# Patient Record
Sex: MALE | Race: BLACK OR AFRICAN AMERICAN | NOT HISPANIC OR LATINO | URBAN - METROPOLITAN AREA
[De-identification: names, ages, dates, MRNs, and addresses within clinical notes are randomized per-mention and may not be internally consistent; named-entity substitution may affect disease eponyms.]

---

## 2019-05-04 ENCOUNTER — INPATIENT (INPATIENT)
Facility: HOSPITAL | Age: 64
LOS: 4 days | Discharge: NURSING, REHAB OR LONG-TERM CARE | DRG: 554 | End: 2019-05-09
Attending: INTERNAL MEDICINE | Admitting: INTERNAL MEDICINE
Payer: COMMERCIAL

## 2019-05-04 VITALS
HEART RATE: 70 BPM | RESPIRATION RATE: 16 BRPM | TEMPERATURE: 98 F | SYSTOLIC BLOOD PRESSURE: 150 MMHG | WEIGHT: 220.02 LBS | OXYGEN SATURATION: 98 % | DIASTOLIC BLOOD PRESSURE: 90 MMHG

## 2019-05-04 LAB
ALBUMIN SERPL ELPH-MCNC: 3.5 G/DL — SIGNIFICANT CHANGE UP (ref 3.5–5)
ALP SERPL-CCNC: 67 U/L — SIGNIFICANT CHANGE UP (ref 40–120)
ALT FLD-CCNC: 17 U/L DA — SIGNIFICANT CHANGE UP (ref 10–60)
ANION GAP SERPL CALC-SCNC: 5 MMOL/L — SIGNIFICANT CHANGE UP (ref 5–17)
AST SERPL-CCNC: 20 U/L — SIGNIFICANT CHANGE UP (ref 10–40)
BASOPHILS # BLD AUTO: 0.04 K/UL — SIGNIFICANT CHANGE UP (ref 0–0.2)
BASOPHILS NFR BLD AUTO: 0.6 % — SIGNIFICANT CHANGE UP (ref 0–2)
BILIRUB SERPL-MCNC: 0.4 MG/DL — SIGNIFICANT CHANGE UP (ref 0.2–1.2)
BUN SERPL-MCNC: 10 MG/DL — SIGNIFICANT CHANGE UP (ref 7–18)
CALCIUM SERPL-MCNC: 9 MG/DL — SIGNIFICANT CHANGE UP (ref 8.4–10.5)
CHLORIDE SERPL-SCNC: 103 MMOL/L — SIGNIFICANT CHANGE UP (ref 96–108)
CO2 SERPL-SCNC: 29 MMOL/L — SIGNIFICANT CHANGE UP (ref 22–31)
CREAT SERPL-MCNC: 0.8 MG/DL — SIGNIFICANT CHANGE UP (ref 0.5–1.3)
EOSINOPHIL # BLD AUTO: 0.35 K/UL — SIGNIFICANT CHANGE UP (ref 0–0.5)
EOSINOPHIL NFR BLD AUTO: 5.4 % — SIGNIFICANT CHANGE UP (ref 0–6)
ERYTHROCYTE [SEDIMENTATION RATE] IN BLOOD: 62 MM/HR — HIGH (ref 0–20)
GLUCOSE SERPL-MCNC: 81 MG/DL — SIGNIFICANT CHANGE UP (ref 70–99)
HCT VFR BLD CALC: 39.4 % — SIGNIFICANT CHANGE UP (ref 39–50)
HGB BLD-MCNC: 13.4 G/DL — SIGNIFICANT CHANGE UP (ref 13–17)
IMM GRANULOCYTES NFR BLD AUTO: 0.2 % — SIGNIFICANT CHANGE UP (ref 0–1.5)
LACTATE SERPL-SCNC: 0.7 MMOL/L — SIGNIFICANT CHANGE UP (ref 0.7–2)
LIDOCAIN IGE QN: 99 U/L — SIGNIFICANT CHANGE UP (ref 73–393)
LYMPHOCYTES # BLD AUTO: 1.58 K/UL — SIGNIFICANT CHANGE UP (ref 1–3.3)
LYMPHOCYTES # BLD AUTO: 24.6 % — SIGNIFICANT CHANGE UP (ref 13–44)
MCHC RBC-ENTMCNC: 32.8 PG — SIGNIFICANT CHANGE UP (ref 27–34)
MCHC RBC-ENTMCNC: 34 GM/DL — SIGNIFICANT CHANGE UP (ref 32–36)
MCV RBC AUTO: 96.3 FL — SIGNIFICANT CHANGE UP (ref 80–100)
MONOCYTES # BLD AUTO: 0.45 K/UL — SIGNIFICANT CHANGE UP (ref 0–0.9)
MONOCYTES NFR BLD AUTO: 7 % — SIGNIFICANT CHANGE UP (ref 2–14)
NEUTROPHILS # BLD AUTO: 4 K/UL — SIGNIFICANT CHANGE UP (ref 1.8–7.4)
NEUTROPHILS NFR BLD AUTO: 62.2 % — SIGNIFICANT CHANGE UP (ref 43–77)
NRBC # BLD: 0 /100 WBCS — SIGNIFICANT CHANGE UP (ref 0–0)
PLATELET # BLD AUTO: 288 K/UL — SIGNIFICANT CHANGE UP (ref 150–400)
POTASSIUM SERPL-MCNC: 4 MMOL/L — SIGNIFICANT CHANGE UP (ref 3.5–5.3)
POTASSIUM SERPL-SCNC: 4 MMOL/L — SIGNIFICANT CHANGE UP (ref 3.5–5.3)
PROT SERPL-MCNC: 7.9 G/DL — SIGNIFICANT CHANGE UP (ref 6–8.3)
RBC # BLD: 4.09 M/UL — LOW (ref 4.2–5.8)
RBC # FLD: 11.6 % — SIGNIFICANT CHANGE UP (ref 10.3–14.5)
SODIUM SERPL-SCNC: 137 MMOL/L — SIGNIFICANT CHANGE UP (ref 135–145)
WBC # BLD: 6.43 K/UL — SIGNIFICANT CHANGE UP (ref 3.8–10.5)
WBC # FLD AUTO: 6.43 K/UL — SIGNIFICANT CHANGE UP (ref 3.8–10.5)

## 2019-05-04 PROCEDURE — 99285 EMERGENCY DEPT VISIT HI MDM: CPT | Mod: 25

## 2019-05-04 PROCEDURE — 74177 CT ABD & PELVIS W/CONTRAST: CPT | Mod: 26

## 2019-05-04 RX ORDER — COLCHICINE 0.6 MG
1.8 TABLET ORAL ONCE
Qty: 0 | Refills: 0 | Status: COMPLETED | OUTPATIENT
Start: 2019-05-04 | End: 2019-05-04

## 2019-05-04 RX ORDER — SODIUM CHLORIDE 9 MG/ML
3 INJECTION INTRAMUSCULAR; INTRAVENOUS; SUBCUTANEOUS ONCE
Qty: 0 | Refills: 0 | Status: COMPLETED | OUTPATIENT
Start: 2019-05-04 | End: 2019-05-04

## 2019-05-04 RX ORDER — KETOROLAC TROMETHAMINE 30 MG/ML
30 SYRINGE (ML) INJECTION ONCE
Qty: 0 | Refills: 0 | Status: DISCONTINUED | OUTPATIENT
Start: 2019-05-04 | End: 2019-05-04

## 2019-05-04 RX ORDER — SODIUM CHLORIDE 9 MG/ML
1000 INJECTION INTRAMUSCULAR; INTRAVENOUS; SUBCUTANEOUS ONCE
Qty: 0 | Refills: 0 | Status: COMPLETED | OUTPATIENT
Start: 2019-05-04 | End: 2019-05-04

## 2019-05-04 RX ADMIN — SODIUM CHLORIDE 3 MILLILITER(S): 9 INJECTION INTRAMUSCULAR; INTRAVENOUS; SUBCUTANEOUS at 18:59

## 2019-05-04 RX ADMIN — Medication 30 MILLIGRAM(S): at 20:08

## 2019-05-04 RX ADMIN — Medication 1.8 MILLIGRAM(S): at 20:07

## 2019-05-04 RX ADMIN — SODIUM CHLORIDE 1000 MILLILITER(S): 9 INJECTION INTRAMUSCULAR; INTRAVENOUS; SUBCUTANEOUS at 19:00

## 2019-05-04 NOTE — ED PROVIDER NOTE - PSH
Carpal tunnel syndrome on both sides  s/p carpal tunner release - right November 2013, left March 2014  History of excision of mass  elbow mass excision 2014  Knee osteoarthritis  s/p left total knee replacement 2013, September  [facility]

## 2019-05-04 NOTE — H&P ADULT - NSICDXPASTMEDICALHX_GEN_ALL_CORE_FT
PAST MEDICAL HISTORY:  CAD (Coronary Artery Disease) cad 2007    Cervical stenosis of spine     Cholelithiasis     Coronary Stent "x1"  name of hospital not sure    Diabetes Mellitus Type II     Diabetic Neuropathy     Epicondylitis, lateral (tennis elbow), left     Falls frequently cervical stenosis & back pain    Gout     Hemiplegia     HTN (Hypertension)     Hyperlipidemia     FRIEDA (obstructive sleep apnea) not on machine

## 2019-05-04 NOTE — ED PROVIDER NOTE - CLINICAL SUMMARY MEDICAL DECISION MAKING FREE TEXT BOX
Possible gout exacerbation with diarrhea of either viral/ bacterial vs inflammatory etiology. Obtain labs, hydrate, CT scan. Will consider admission given duration of Sx.

## 2019-05-04 NOTE — ED PROVIDER NOTE - OBJECTIVE STATEMENT
64 y/o M patient with a significant PMHx of CAD, Cervical stenosis of spine, Cholelithiasis, Coronary stent, DM, Diabetic Neuropathy, Epicondylitis, Frequent falls, Gout, right sided Hemiplegia secondary to complications of spinal surgery, HTN, HLD, FRIEDA, and a significant PSHx of Carpal tunnel, Excision of elbow mass, and knee osteoarthritis presents to the ED with x3 weeks of joint pain and swelling mainly to the left hand, right elbow, right wrist, right knee and right foot. Patient denies trauma. Patient also mentions diarrhea for the past x2 weeks with x1-x2 episodes per day. Patient describes his BMs as watery and dark. Patient says he has been taking Peptol Bismol. Patient denies fever, chills, nausea, vomiting, GI Sx,  Sx, and any other complaints. Patient notes he is experiencing similar Sx to prior gout attacks. Allergies: Diovan (Rash).

## 2019-05-04 NOTE — H&P ADULT - NSHPPHYSICALEXAM_GEN_ALL_CORE
Vital Signs Last 24 Hrs  T(C): 36.8 (05 May 2019 01:47), Max: 36.8 (04 May 2019 17:04)  T(F): 98.3 (05 May 2019 01:47), Max: 98.3 (04 May 2019 17:04)  HR: 64 (05 May 2019 01:47) (64 - 70)  BP: 134/71 (05 May 2019 01:47) (134/71 - 150/90)  RR: 16 (05 May 2019 01:47) (16 - 16)  SpO2: 96% (05 May 2019 01:47) (96% - 98%)  .  GENERAL: Well developed,  male, NAD  HEENT:  Normocephalic/Atraumatic, reactive light reflex, moist mucous membranes  NECK: Supple, no JVD  RESP: Symmetric movement of the chest, clear to auscultation bilaterally  CVS: S1 and S2 audible, no murmur, rubs or gallops noted  GI: Normal active bowel sounds present, abdomen soft, non tender, non distended  EXTREMITIES:  No edema, no clubbing, cyanosis  MSK: Rt elbow pain, Left hand 1st interphalangeal joints pain/swelling, Rt ankle pain  PSYCH: Normal mood, normal affect observed    NEURO: Alert and oriented x 3

## 2019-05-04 NOTE — H&P ADULT - NSHPOUTPATIENTPROVIDERS_GEN_ALL_CORE
Please Approve or Refuse.   Send to Pharmacy per Pt's Request:      Next Visit Date:  1/21/2019   Last Visit Date: 7/24/2018    Hemoglobin A1C (%)   Date Value   01/04/2018 5.1   09/06/2017 5.3   11/16/2016 5.3             ( goal A1C is < 7)   BP Readings from Last 3 Encounters:   10/08/18 115/62   10/08/18 106/65   09/28/18 122/66          (goal 120/80)  BUN   Date Value Ref Range Status   09/28/2018 15 6 - 20 mg/dL Final     CREATININE   Date Value Ref Range Status   09/28/2018 0.71 0.50 - 0.90 mg/dL Final     Potassium   Date Value Ref Range Status   09/28/2018 4.2 3.7 - 5.3 mmol/L Final
Dr Aleman

## 2019-05-04 NOTE — ED PROVIDER NOTE - PMH
CAD (Coronary Artery Disease)  cad 2007  Cervical stenosis of spine    Cholelithiasis    Coronary Stent  "x1"  name of hospital not sure  Diabetes Mellitus Type II    Diabetic Neuropathy    Epicondylitis, lateral (tennis elbow), left    Falls frequently  cervical stenosis & back pain  Gout    Hemiplegia    HTN (Hypertension)    Hyperlipidemia    FRIEDA (obstructive sleep apnea)  not on machine

## 2019-05-04 NOTE — H&P ADULT - ATTENDING COMMENTS
Seen and examined in ED 10.30 PM 5/4 with wife in room . Admitted with polyarthritis sec to gout flare up . Will start him Colchicine and hold Allopurinol .

## 2019-05-04 NOTE — H&P ADULT - ASSESSMENT
63 Male with PMH of HTN, Gout, Pre-DM, CAD (1 stent in 2008), Cervical radiculopathy (s/p surgery in 2016) and Rt Hemiplegia due to cervical spine surgery came to hospital for diffuse pain and swelling of hand and foot joints. Admitted to medicine for gout flare.

## 2019-05-04 NOTE — H&P ADULT - NSICDXPASTSURGICALHX_GEN_ALL_CORE_FT
PAST SURGICAL HISTORY:  Carpal tunnel syndrome on both sides s/p carpal tunner release - right November 2013, left March 2014    History of excision of mass elbow mass excision 2014    Knee osteoarthritis s/p left total knee replacement 2013, September  [facility]

## 2019-05-04 NOTE — H&P ADULT - HISTORY OF PRESENT ILLNESS
63 Male with PMH of HTN, Gout, Pre-DM, CAD (1 stent in 2008), Cervical radiculopathy (s/p surgery in 2016) and Rt Hemiplegia due to cervical spine surgery came to hospital for diffuse pain and swelling of hand and foot joints. Symptoms has been happening for 3 weeks. Pt states, usually his joint pain resolves within few days. Last gout flare was 5 years ago. Recently he had to change his insurance which made him switch from colchicine to allopurinol. Pt has noticed dry rash in his chest, hands, scrotum and dark diarrhea for 2 weeks. No fever, chest pain, dryness of oral cavity, morning stiffness, problems with urine or bowel, sexual activity.      SH: Lives with girlfriend, Walks with cane. Smokes 1 PPD and drinks alcohol socially.      ED Course: Hemodynamically stable. Labs showed ESR of 62. Ct abdomen was normal.

## 2019-05-04 NOTE — ED PROVIDER NOTE - PROGRESS NOTE DETAILS
Pt reevaluated at bedside, pain improved but still unable to stand/walk at baseline. Concern for possible falls, will admit for further pain control and PT evaluation. Dr Cruz/MAR aware, agree w plan

## 2019-05-04 NOTE — ED PROVIDER NOTE - CHPI ED SYMPTOMS POS
JOINT SWELLING/PAIN/swelling mainly to the left hand, right elbow, right wrist, right knee and right foot, diarrhea

## 2019-05-05 DIAGNOSIS — R73.03 PREDIABETES: ICD-10-CM

## 2019-05-05 DIAGNOSIS — I10 ESSENTIAL (PRIMARY) HYPERTENSION: ICD-10-CM

## 2019-05-05 DIAGNOSIS — Z29.9 ENCOUNTER FOR PROPHYLACTIC MEASURES, UNSPECIFIED: ICD-10-CM

## 2019-05-05 DIAGNOSIS — I25.10 ATHEROSCLEROTIC HEART DISEASE OF NATIVE CORONARY ARTERY WITHOUT ANGINA PECTORIS: ICD-10-CM

## 2019-05-05 DIAGNOSIS — R26.2 DIFFICULTY IN WALKING, NOT ELSEWHERE CLASSIFIED: ICD-10-CM

## 2019-05-05 DIAGNOSIS — M10.9 GOUT, UNSPECIFIED: ICD-10-CM

## 2019-05-05 LAB
ALBUMIN SERPL ELPH-MCNC: 3.3 G/DL — LOW (ref 3.5–5)
ALP SERPL-CCNC: 65 U/L — SIGNIFICANT CHANGE UP (ref 40–120)
ALT FLD-CCNC: 16 U/L DA — SIGNIFICANT CHANGE UP (ref 10–60)
ANION GAP SERPL CALC-SCNC: 5 MMOL/L — SIGNIFICANT CHANGE UP (ref 5–17)
AST SERPL-CCNC: 12 U/L — SIGNIFICANT CHANGE UP (ref 10–40)
BASOPHILS # BLD AUTO: 0.04 K/UL — SIGNIFICANT CHANGE UP (ref 0–0.2)
BASOPHILS NFR BLD AUTO: 0.7 % — SIGNIFICANT CHANGE UP (ref 0–2)
BILIRUB SERPL-MCNC: 0.2 MG/DL — SIGNIFICANT CHANGE UP (ref 0.2–1.2)
BUN SERPL-MCNC: 15 MG/DL — SIGNIFICANT CHANGE UP (ref 7–18)
CALCIUM SERPL-MCNC: 8.8 MG/DL — SIGNIFICANT CHANGE UP (ref 8.4–10.5)
CHLORIDE SERPL-SCNC: 104 MMOL/L — SIGNIFICANT CHANGE UP (ref 96–108)
CHOLEST SERPL-MCNC: 176 MG/DL — SIGNIFICANT CHANGE UP (ref 10–199)
CO2 SERPL-SCNC: 28 MMOL/L — SIGNIFICANT CHANGE UP (ref 22–31)
CREAT SERPL-MCNC: 0.86 MG/DL — SIGNIFICANT CHANGE UP (ref 0.5–1.3)
EOSINOPHIL # BLD AUTO: 0.32 K/UL — SIGNIFICANT CHANGE UP (ref 0–0.5)
EOSINOPHIL NFR BLD AUTO: 5.9 % — SIGNIFICANT CHANGE UP (ref 0–6)
FOLATE SERPL-MCNC: >20 NG/ML — SIGNIFICANT CHANGE UP
GLUCOSE BLDC GLUCOMTR-MCNC: 112 MG/DL — HIGH (ref 70–99)
GLUCOSE BLDC GLUCOMTR-MCNC: 126 MG/DL — HIGH (ref 70–99)
GLUCOSE BLDC GLUCOMTR-MCNC: 145 MG/DL — HIGH (ref 70–99)
GLUCOSE BLDC GLUCOMTR-MCNC: 232 MG/DL — HIGH (ref 70–99)
GLUCOSE SERPL-MCNC: 96 MG/DL — SIGNIFICANT CHANGE UP (ref 70–99)
HBA1C BLD-MCNC: 5.9 % — HIGH (ref 4–5.6)
HCT VFR BLD CALC: 37.3 % — LOW (ref 39–50)
HCV AB S/CO SERPL IA: 0.06 S/CO — SIGNIFICANT CHANGE UP (ref 0–0.99)
HCV AB SERPL-IMP: SIGNIFICANT CHANGE UP
HDLC SERPL-MCNC: 34 MG/DL — LOW
HGB BLD-MCNC: 12.6 G/DL — LOW (ref 13–17)
IMM GRANULOCYTES NFR BLD AUTO: 0.2 % — SIGNIFICANT CHANGE UP (ref 0–1.5)
LIPID PNL WITH DIRECT LDL SERPL: 114 MG/DL — SIGNIFICANT CHANGE UP
LYMPHOCYTES # BLD AUTO: 2.03 K/UL — SIGNIFICANT CHANGE UP (ref 1–3.3)
LYMPHOCYTES # BLD AUTO: 37.5 % — SIGNIFICANT CHANGE UP (ref 13–44)
MAGNESIUM SERPL-MCNC: 2 MG/DL — SIGNIFICANT CHANGE UP (ref 1.6–2.6)
MCHC RBC-ENTMCNC: 32.4 PG — SIGNIFICANT CHANGE UP (ref 27–34)
MCHC RBC-ENTMCNC: 33.8 GM/DL — SIGNIFICANT CHANGE UP (ref 32–36)
MCV RBC AUTO: 95.9 FL — SIGNIFICANT CHANGE UP (ref 80–100)
MONOCYTES # BLD AUTO: 0.41 K/UL — SIGNIFICANT CHANGE UP (ref 0–0.9)
MONOCYTES NFR BLD AUTO: 7.6 % — SIGNIFICANT CHANGE UP (ref 2–14)
NEUTROPHILS # BLD AUTO: 2.6 K/UL — SIGNIFICANT CHANGE UP (ref 1.8–7.4)
NEUTROPHILS NFR BLD AUTO: 48.1 % — SIGNIFICANT CHANGE UP (ref 43–77)
NRBC # BLD: 0 /100 WBCS — SIGNIFICANT CHANGE UP (ref 0–0)
PHOSPHATE SERPL-MCNC: 4.4 MG/DL — SIGNIFICANT CHANGE UP (ref 2.5–4.5)
PLATELET # BLD AUTO: 282 K/UL — SIGNIFICANT CHANGE UP (ref 150–400)
POTASSIUM SERPL-MCNC: 3.5 MMOL/L — SIGNIFICANT CHANGE UP (ref 3.5–5.3)
POTASSIUM SERPL-SCNC: 3.5 MMOL/L — SIGNIFICANT CHANGE UP (ref 3.5–5.3)
PROT SERPL-MCNC: 7.2 G/DL — SIGNIFICANT CHANGE UP (ref 6–8.3)
RBC # BLD: 3.89 M/UL — LOW (ref 4.2–5.8)
RBC # FLD: 11.6 % — SIGNIFICANT CHANGE UP (ref 10.3–14.5)
SODIUM SERPL-SCNC: 137 MMOL/L — SIGNIFICANT CHANGE UP (ref 135–145)
TOTAL CHOLESTEROL/HDL RATIO MEASUREMENT: 5.2 RATIO — SIGNIFICANT CHANGE UP (ref 3.4–9.6)
TRIGL SERPL-MCNC: 141 MG/DL — SIGNIFICANT CHANGE UP (ref 10–149)
TSH SERPL-MCNC: 2.89 UU/ML — SIGNIFICANT CHANGE UP (ref 0.34–4.82)
URATE SERPL-MCNC: 7.1 MG/DL — SIGNIFICANT CHANGE UP (ref 3.4–8.8)
VIT B12 SERPL-MCNC: 728 PG/ML — SIGNIFICANT CHANGE UP (ref 232–1245)
WBC # BLD: 5.41 K/UL — SIGNIFICANT CHANGE UP (ref 3.8–10.5)
WBC # FLD AUTO: 5.41 K/UL — SIGNIFICANT CHANGE UP (ref 3.8–10.5)

## 2019-05-05 PROCEDURE — 73620 X-RAY EXAM OF FOOT: CPT | Mod: 26,RT

## 2019-05-05 PROCEDURE — 73120 X-RAY EXAM OF HAND: CPT | Mod: 26,50

## 2019-05-05 RX ORDER — COLCHICINE 0.6 MG
0.6 TABLET ORAL DAILY
Qty: 0 | Refills: 0 | Status: DISCONTINUED | OUTPATIENT
Start: 2019-05-05 | End: 2019-05-05

## 2019-05-05 RX ORDER — COLCHICINE 0.6 MG
0.6 TABLET ORAL
Qty: 0 | Refills: 0 | Status: DISCONTINUED | OUTPATIENT
Start: 2019-05-05 | End: 2019-05-08

## 2019-05-05 RX ORDER — NYSTATIN CREAM 100000 [USP'U]/G
1 CREAM TOPICAL
Qty: 0 | Refills: 0 | Status: DISCONTINUED | OUTPATIENT
Start: 2019-05-05 | End: 2019-05-09

## 2019-05-05 RX ORDER — ENOXAPARIN SODIUM 100 MG/ML
40 INJECTION SUBCUTANEOUS DAILY
Qty: 0 | Refills: 0 | Status: DISCONTINUED | OUTPATIENT
Start: 2019-05-05 | End: 2019-05-09

## 2019-05-05 RX ORDER — INSULIN LISPRO 100/ML
VIAL (ML) SUBCUTANEOUS
Qty: 0 | Refills: 0 | Status: DISCONTINUED | OUTPATIENT
Start: 2019-05-05 | End: 2019-05-09

## 2019-05-05 RX ORDER — ASPIRIN/CALCIUM CARB/MAGNESIUM 324 MG
81 TABLET ORAL DAILY
Qty: 0 | Refills: 0 | Status: DISCONTINUED | OUTPATIENT
Start: 2019-05-05 | End: 2019-05-09

## 2019-05-05 RX ORDER — LOSARTAN POTASSIUM 100 MG/1
25 TABLET, FILM COATED ORAL DAILY
Qty: 0 | Refills: 0 | Status: DISCONTINUED | OUTPATIENT
Start: 2019-05-05 | End: 2019-05-05

## 2019-05-05 RX ORDER — LISINOPRIL 2.5 MG/1
10 TABLET ORAL DAILY
Qty: 0 | Refills: 0 | Status: DISCONTINUED | OUTPATIENT
Start: 2019-05-05 | End: 2019-05-06

## 2019-05-05 RX ORDER — ACETAMINOPHEN 500 MG
650 TABLET ORAL EVERY 6 HOURS
Qty: 0 | Refills: 0 | Status: DISCONTINUED | OUTPATIENT
Start: 2019-05-05 | End: 2019-05-09

## 2019-05-05 RX ADMIN — ENOXAPARIN SODIUM 40 MILLIGRAM(S): 100 INJECTION SUBCUTANEOUS at 12:31

## 2019-05-05 RX ADMIN — Medication 650 MILLIGRAM(S): at 19:33

## 2019-05-05 RX ADMIN — Medication 81 MILLIGRAM(S): at 12:30

## 2019-05-05 RX ADMIN — Medication 0.6 MILLIGRAM(S): at 05:32

## 2019-05-05 RX ADMIN — Medication 650 MILLIGRAM(S): at 20:15

## 2019-05-05 RX ADMIN — Medication 40 MILLIGRAM(S): at 05:32

## 2019-05-05 RX ADMIN — Medication 30 MILLIGRAM(S): at 06:18

## 2019-05-05 RX ADMIN — Medication 20 MILLIGRAM(S): at 19:33

## 2019-05-05 RX ADMIN — NYSTATIN CREAM 1 APPLICATION(S): 100000 CREAM TOPICAL at 06:18

## 2019-05-05 RX ADMIN — LISINOPRIL 10 MILLIGRAM(S): 2.5 TABLET ORAL at 21:50

## 2019-05-05 RX ADMIN — NYSTATIN CREAM 1 APPLICATION(S): 100000 CREAM TOPICAL at 17:46

## 2019-05-05 RX ADMIN — Medication 0.6 MILLIGRAM(S): at 17:46

## 2019-05-05 RX ADMIN — Medication 2: at 11:13

## 2019-05-05 NOTE — PHYSICAL THERAPY INITIAL EVALUATION ADULT - ADDITIONAL COMMENTS
Patient reports he lives with girlfriend in 3rd floor walk up apartment.  has approximately 8 steps to enter building and 21 steps to reach apartment level.  Patient ambulates independently with quad cane and right AFO.  Has wheelchair, shower chair and commode at home.

## 2019-05-05 NOTE — ED ADULT NURSE NOTE - NSIMPLEMENTINTERV_GEN_ALL_ED
Implemented All Fall Risk Interventions:  [place] to call system. Call bell, personal items and telephone within reach. Instruct patient to call for assistance. Room bathroom lighting operational. Non-slip footwear when patient is off stretcher. Physically safe environment: no spills, clutter or unnecessary equipment. Stretcher in lowest position, wheels locked, appropriate side rails in place. Provide visual cue, wrist band, yellow gown, etc. Monitor gait and stability. Monitor for mental status changes and reorient to person, place, and time. Review medications for side effects contributing to fall risk. Reinforce activity limits and safety measures with patient and family.

## 2019-05-05 NOTE — PHYSICAL THERAPY INITIAL EVALUATION ADULT - ACTIVE RANGE OF MOTION EXAMINATION, REHAB EVAL
Right shoulder flexion~ 70 degrees, no active right elbow flexion/extension or hand mobility/Left UE Active ROM was WNL (within normal limits)/LLE Active ROM was WNL (within normal limits)

## 2019-05-05 NOTE — PHYSICAL THERAPY INITIAL EVALUATION ADULT - PASSIVE RANGE OF MOTION EXAMINATION, REHAB EVAL
Left LE Passive ROM was WNL (within normal limits)/Right UE Passive ROM was WNL (within normal limits)

## 2019-05-05 NOTE — PROGRESS NOTE ADULT - ASSESSMENT
63 Male with PMH of HTN, Gout, Pre-DM, CAD (1 stent in 2008), Cervical radiculopathy (s/p surgery in 2016) and Rt Hemiplegia due to cervical spine surgery came to hospital for diffuse pain and swelling of hand and foot joints. Admitted to medicine for gout flare.      Problem/Plan - 1:  ·  Problem: Acute Gout attack with Polyarthritis .  Plan: Rt elbow pain, Left hand 1st interphalangeal joints pain/swelling, Rt ankle pain  Swollen and redness of joints   Serum uric acid level WNL but was taking Allopurinol .Checking RF and DO.   Started Prednisone and Colchicine   F/u Blood Cx for the suspicion of multiple joints involvement to r/o septic arthritis although very low suspicion as no fever or Leucocytosis .   Ordered Xray films.   Problem/Plan - 2:  ·  Problem: HTN (hypertension).  Plan: Restart home meds.      Problem/Plan - 3:  ·  Problem: CAD (coronary artery disease).  Plan: Continue home meds.    Problem/Plan - 4:  ·  Problem: Prediabetes.  Plan: Sugars and hGBA1C fine.      Problem/Plan - 5:  ·  Problem: Need for prophylactic measure.  Plan: Improve score 2  Lovenox sq.

## 2019-05-05 NOTE — ED ADULT NURSE REASSESSMENT NOTE - NS ED NURSE REASSESS COMMENT FT1
Received pt from nurse Mandi CAIN admitted to reg floor , awaiting for a bed availability , was asking to removed S/l -since not getting any IV med ,got morning care , stable , nad .

## 2019-05-06 DIAGNOSIS — R19.5 OTHER FECAL ABNORMALITIES: ICD-10-CM

## 2019-05-06 DIAGNOSIS — M10.9 GOUT, UNSPECIFIED: ICD-10-CM

## 2019-05-06 LAB
GLUCOSE BLDC GLUCOMTR-MCNC: 106 MG/DL — HIGH (ref 70–99)
GLUCOSE BLDC GLUCOMTR-MCNC: 110 MG/DL — HIGH (ref 70–99)
GLUCOSE BLDC GLUCOMTR-MCNC: 162 MG/DL — HIGH (ref 70–99)
GLUCOSE BLDC GLUCOMTR-MCNC: 163 MG/DL — HIGH (ref 70–99)
HCT VFR BLD CALC: 37.8 % — LOW (ref 39–50)
HGB BLD-MCNC: 12.9 G/DL — LOW (ref 13–17)
MCHC RBC-ENTMCNC: 32.2 PG — SIGNIFICANT CHANGE UP (ref 27–34)
MCHC RBC-ENTMCNC: 34.1 GM/DL — SIGNIFICANT CHANGE UP (ref 32–36)
MCV RBC AUTO: 94.3 FL — SIGNIFICANT CHANGE UP (ref 80–100)
NRBC # BLD: 0 /100 WBCS — SIGNIFICANT CHANGE UP (ref 0–0)
OB PNL STL: NEGATIVE — SIGNIFICANT CHANGE UP
OB PNL STL: NEGATIVE — SIGNIFICANT CHANGE UP
PLATELET # BLD AUTO: 302 K/UL — SIGNIFICANT CHANGE UP (ref 150–400)
RBC # BLD: 4.01 M/UL — LOW (ref 4.2–5.8)
RBC # FLD: 11.3 % — SIGNIFICANT CHANGE UP (ref 10.3–14.5)
RHEUMATOID FACT SERPL-ACNC: <10 IU/ML — SIGNIFICANT CHANGE UP (ref 0–13)
WBC # BLD: 6.47 K/UL — SIGNIFICANT CHANGE UP (ref 3.8–10.5)
WBC # FLD AUTO: 6.47 K/UL — SIGNIFICANT CHANGE UP (ref 3.8–10.5)

## 2019-05-06 RX ORDER — ATORVASTATIN CALCIUM 80 MG/1
40 TABLET, FILM COATED ORAL AT BEDTIME
Qty: 0 | Refills: 0 | Status: DISCONTINUED | OUTPATIENT
Start: 2019-05-06 | End: 2019-05-09

## 2019-05-06 RX ORDER — BENZOCAINE AND MENTHOL 5; 1 G/100ML; G/100ML
1 LIQUID ORAL
Qty: 0 | Refills: 0 | Status: DISCONTINUED | OUTPATIENT
Start: 2019-05-06 | End: 2019-05-09

## 2019-05-06 RX ORDER — AMLODIPINE BESYLATE 2.5 MG/1
5 TABLET ORAL DAILY
Qty: 0 | Refills: 0 | Status: DISCONTINUED | OUTPATIENT
Start: 2019-05-06 | End: 2019-05-07

## 2019-05-06 RX ORDER — LABETALOL HCL 100 MG
100 TABLET ORAL
Qty: 0 | Refills: 0 | Status: DISCONTINUED | OUTPATIENT
Start: 2019-05-06 | End: 2019-05-09

## 2019-05-06 RX ORDER — PANTOPRAZOLE SODIUM 20 MG/1
40 TABLET, DELAYED RELEASE ORAL
Qty: 0 | Refills: 0 | Status: DISCONTINUED | OUTPATIENT
Start: 2019-05-06 | End: 2019-05-08

## 2019-05-06 RX ORDER — TRAMADOL HYDROCHLORIDE 50 MG/1
25 TABLET ORAL ONCE
Qty: 0 | Refills: 0 | Status: DISCONTINUED | OUTPATIENT
Start: 2019-05-06 | End: 2019-05-06

## 2019-05-06 RX ORDER — PANTOPRAZOLE SODIUM 20 MG/1
40 TABLET, DELAYED RELEASE ORAL
Qty: 0 | Refills: 0 | Status: DISCONTINUED | OUTPATIENT
Start: 2019-05-06 | End: 2019-05-06

## 2019-05-06 RX ADMIN — PANTOPRAZOLE SODIUM 40 MILLIGRAM(S): 20 TABLET, DELAYED RELEASE ORAL at 17:09

## 2019-05-06 RX ADMIN — NYSTATIN CREAM 1 APPLICATION(S): 100000 CREAM TOPICAL at 17:10

## 2019-05-06 RX ADMIN — TRAMADOL HYDROCHLORIDE 25 MILLIGRAM(S): 50 TABLET ORAL at 03:50

## 2019-05-06 RX ADMIN — Medication 81 MILLIGRAM(S): at 11:20

## 2019-05-06 RX ADMIN — Medication 0.6 MILLIGRAM(S): at 06:37

## 2019-05-06 RX ADMIN — NYSTATIN CREAM 1 APPLICATION(S): 100000 CREAM TOPICAL at 06:40

## 2019-05-06 RX ADMIN — ATORVASTATIN CALCIUM 40 MILLIGRAM(S): 80 TABLET, FILM COATED ORAL at 21:29

## 2019-05-06 RX ADMIN — Medication 100 MILLIGRAM(S): at 17:09

## 2019-05-06 RX ADMIN — Medication 20 MILLIGRAM(S): at 06:37

## 2019-05-06 RX ADMIN — Medication 0.6 MILLIGRAM(S): at 17:09

## 2019-05-06 RX ADMIN — Medication 20 MILLIGRAM(S): at 17:09

## 2019-05-06 RX ADMIN — TRAMADOL HYDROCHLORIDE 25 MILLIGRAM(S): 50 TABLET ORAL at 06:44

## 2019-05-06 NOTE — CONSULT NOTE ADULT - ASSESSMENT
63M w/ PMHx Gout, current smoker c/o melena x 2 weeks. Reporting melena x2 during admission. FOBT negative. H/H 12.6/ 37.3, BUN/Cr WNL's. Hemodynamically stable at the moment. 63M w/ PMHx Gout, current smoker c/o melena x 2 weeks. Reporting melena x2 during admission. FOBT negative. H/H 12.6/ 37.3, BUN/Cr WNL's. Hemodynamically stable at the moment.     *incomplete 63M w/ PMHx Gout, current smoker c/o melena x 2 weeks. Reporting melena x2 during admission. FOBT negative. H/H 12.6/ 37.3, BUN/Cr WNL's. Hemodynamically stable at the moment.   Plan for EGD on Wednesday: 5/8. Monitor CBC q12hrs for now + PPI BID.  Will follow with you.

## 2019-05-06 NOTE — PROGRESS NOTE ADULT - ATTENDING COMMENTS
Seen and examined . My joints are better but had dark stool . Will check CBC Q8hrs,transfuse for Hgb<8G ,PPI and GI consult. Continue prednisone and Colchicine.

## 2019-05-06 NOTE — PROGRESS NOTE ADULT - PROBLEM SELECTOR PLAN 4
Pt takes aspirin and plavix  Plavix can be held as stent is old 2008   c/w Aspirin .started on statins

## 2019-05-06 NOTE — CHART NOTE - NSCHARTNOTEFT_GEN_A_CORE
EVENT: c/o Sore throat    OBJECTIVE:  Vital Signs Last 24 Hrs  T(C): 37.1 (06 May 2019 00:26), Max: 37.1 (06 May 2019 00:26)  T(F): 98.7 (06 May 2019 00:26), Max: 98.7 (06 May 2019 00:26)  HR: 69 (06 May 2019 05:39) (61 - 78)  BP: 149/79 (06 May 2019 05:39) (145/81 - 172/99)  BP(mean): --  RR: 18 (06 May 2019 00:26) (16 - 19)  SpO2: 97% (06 May 2019 00:26) (93% - 98%)      PLAN: Cepacol Lomarlenyger ordered

## 2019-05-06 NOTE — PROGRESS NOTE ADULT - ASSESSMENT
63 Male with PMH of HTN, Gout, Pre-DM, CAD (1 stent in 2008), Cervical radiculopathy (s/p surgery in 2016) and Rt Hemiplegia due to cervical spine surgery came to hospital for diffuse pain and swelling of hand and foot joints. Admitted to medicine for gout flare.      Problem/Plan - 1:  ·  Problem: Acute Gout attack with Polyarthritis .  Plan: Rt elbow pain, Left hand 1st interphalangeal joints pain/swelling, Rt ankle pain  Swollen and redness of joints   Serum uric acid level WNL but was taking Allopurinol .Checking RF and DO.   Started Prednisone and Colchicine   F/u Blood Cx for the suspicion of multiple joints involvement to r/o septic arthritis although very low suspicion as no fever or Leucocytosis .   Ordered Xray films.   Problem/Plan - 2:  ·  Problem: HTN (hypertension).  Plan: Restart home meds.      Problem/Plan - 3:  ·  Problem: CAD (coronary artery disease).  Plan: Continue home meds.    Problem/Plan - 4:  ·  Problem: Prediabetes.  Plan: Sugars and hGBA1C fine.      Problem/Plan - 5:  ·  Problem: Need for prophylactic measure.  Plan: Improve score 2  Lovenox sq. 63 Male with PMH of HTN, Gout, Pre-DM, CAD (1 stent in 2008), Cervical radiculopathy (s/p surgery in 2016) and Rt Hemiplegia due to cervical spine surgery came to hospital for diffuse pain and swelling of hand and foot joints. Admitted to medicine for gout flare.

## 2019-05-06 NOTE — PROGRESS NOTE ADULT - PROBLEM SELECTOR PLAN 2
Pt c/o Dark stools since last 2 weeks   - FOBT -ve x 1  - H/h dropped slightly   - F/u CBC today and repeat FOBT   - GI consulted Dr Diaz Pt c/o Dark stools since last 2 weeks   - FOBT -ve x 1  - H/h dropped slightly   - F/u repeat FOBT   - Plan for EGD on wednesday  - GI consulted Dr Diaz

## 2019-05-07 LAB
ANA TITR SER: NEGATIVE — SIGNIFICANT CHANGE UP
ANION GAP SERPL CALC-SCNC: 6 MMOL/L — SIGNIFICANT CHANGE UP (ref 5–17)
BUN SERPL-MCNC: 13 MG/DL — SIGNIFICANT CHANGE UP (ref 7–18)
CALCIUM SERPL-MCNC: 8.7 MG/DL — SIGNIFICANT CHANGE UP (ref 8.4–10.5)
CHLORIDE SERPL-SCNC: 105 MMOL/L — SIGNIFICANT CHANGE UP (ref 96–108)
CO2 SERPL-SCNC: 27 MMOL/L — SIGNIFICANT CHANGE UP (ref 22–31)
CREAT SERPL-MCNC: 0.83 MG/DL — SIGNIFICANT CHANGE UP (ref 0.5–1.3)
GLUCOSE BLDC GLUCOMTR-MCNC: 105 MG/DL — HIGH (ref 70–99)
GLUCOSE BLDC GLUCOMTR-MCNC: 131 MG/DL — HIGH (ref 70–99)
GLUCOSE BLDC GLUCOMTR-MCNC: 146 MG/DL — HIGH (ref 70–99)
GLUCOSE BLDC GLUCOMTR-MCNC: 184 MG/DL — HIGH (ref 70–99)
GLUCOSE SERPL-MCNC: 94 MG/DL — SIGNIFICANT CHANGE UP (ref 70–99)
HCT VFR BLD CALC: 39.1 % — SIGNIFICANT CHANGE UP (ref 39–50)
HGB BLD-MCNC: 13.1 G/DL — SIGNIFICANT CHANGE UP (ref 13–17)
MAGNESIUM SERPL-MCNC: 2.2 MG/DL — SIGNIFICANT CHANGE UP (ref 1.6–2.6)
MCHC RBC-ENTMCNC: 31.7 PG — SIGNIFICANT CHANGE UP (ref 27–34)
MCHC RBC-ENTMCNC: 33.5 GM/DL — SIGNIFICANT CHANGE UP (ref 32–36)
MCV RBC AUTO: 94.7 FL — SIGNIFICANT CHANGE UP (ref 80–100)
NRBC # BLD: 0 /100 WBCS — SIGNIFICANT CHANGE UP (ref 0–0)
PHOSPHATE SERPL-MCNC: 3.4 MG/DL — SIGNIFICANT CHANGE UP (ref 2.5–4.5)
PLATELET # BLD AUTO: 334 K/UL — SIGNIFICANT CHANGE UP (ref 150–400)
POTASSIUM SERPL-MCNC: 3.3 MMOL/L — LOW (ref 3.5–5.3)
POTASSIUM SERPL-SCNC: 3.3 MMOL/L — LOW (ref 3.5–5.3)
RBC # BLD: 4.13 M/UL — LOW (ref 4.2–5.8)
RBC # FLD: 11.3 % — SIGNIFICANT CHANGE UP (ref 10.3–14.5)
SODIUM SERPL-SCNC: 138 MMOL/L — SIGNIFICANT CHANGE UP (ref 135–145)
WBC # BLD: 7.86 K/UL — SIGNIFICANT CHANGE UP (ref 3.8–10.5)
WBC # FLD AUTO: 7.86 K/UL — SIGNIFICANT CHANGE UP (ref 3.8–10.5)

## 2019-05-07 PROCEDURE — 99223 1ST HOSP IP/OBS HIGH 75: CPT

## 2019-05-07 RX ORDER — INSULIN LISPRO 100/ML
VIAL (ML) SUBCUTANEOUS EVERY 6 HOURS
Qty: 0 | Refills: 0 | Status: COMPLETED | OUTPATIENT
Start: 2019-05-07 | End: 2019-05-08

## 2019-05-07 RX ORDER — SODIUM CHLORIDE 9 MG/ML
1000 INJECTION, SOLUTION INTRAVENOUS
Qty: 0 | Refills: 0 | Status: DISCONTINUED | OUTPATIENT
Start: 2019-05-07 | End: 2019-05-08

## 2019-05-07 RX ORDER — POTASSIUM CHLORIDE 20 MEQ
40 PACKET (EA) ORAL EVERY 4 HOURS
Qty: 0 | Refills: 0 | Status: COMPLETED | OUTPATIENT
Start: 2019-05-07 | End: 2019-05-07

## 2019-05-07 RX ORDER — AMLODIPINE BESYLATE 2.5 MG/1
10 TABLET ORAL DAILY
Qty: 0 | Refills: 0 | Status: DISCONTINUED | OUTPATIENT
Start: 2019-05-07 | End: 2019-05-07

## 2019-05-07 RX ORDER — AMLODIPINE BESYLATE 2.5 MG/1
10 TABLET ORAL DAILY
Qty: 0 | Refills: 0 | Status: DISCONTINUED | OUTPATIENT
Start: 2019-05-07 | End: 2019-05-08

## 2019-05-07 RX ADMIN — Medication 81 MILLIGRAM(S): at 11:12

## 2019-05-07 RX ADMIN — NYSTATIN CREAM 1 APPLICATION(S): 100000 CREAM TOPICAL at 05:57

## 2019-05-07 RX ADMIN — Medication 20 MILLIGRAM(S): at 17:11

## 2019-05-07 RX ADMIN — Medication 100 MILLIGRAM(S): at 05:57

## 2019-05-07 RX ADMIN — Medication 0.6 MILLIGRAM(S): at 05:57

## 2019-05-07 RX ADMIN — ATORVASTATIN CALCIUM 40 MILLIGRAM(S): 80 TABLET, FILM COATED ORAL at 22:04

## 2019-05-07 RX ADMIN — PANTOPRAZOLE SODIUM 40 MILLIGRAM(S): 20 TABLET, DELAYED RELEASE ORAL at 17:11

## 2019-05-07 RX ADMIN — Medication 20 MILLIGRAM(S): at 05:57

## 2019-05-07 RX ADMIN — Medication 100 MILLIGRAM(S): at 17:11

## 2019-05-07 RX ADMIN — Medication 40 MILLIEQUIVALENT(S): at 17:11

## 2019-05-07 RX ADMIN — Medication 40 MILLIEQUIVALENT(S): at 11:13

## 2019-05-07 RX ADMIN — Medication 0.6 MILLIGRAM(S): at 17:11

## 2019-05-07 RX ADMIN — PANTOPRAZOLE SODIUM 40 MILLIGRAM(S): 20 TABLET, DELAYED RELEASE ORAL at 05:57

## 2019-05-07 RX ADMIN — AMLODIPINE BESYLATE 5 MILLIGRAM(S): 2.5 TABLET ORAL at 05:57

## 2019-05-07 RX ADMIN — NYSTATIN CREAM 1 APPLICATION(S): 100000 CREAM TOPICAL at 17:11

## 2019-05-07 NOTE — CONSULT NOTE ADULT - ASSESSMENT
a/p     1) CAD s/p PCI - cont asa no cp no SOB no in pt workup needed    2) HTN - bp elevated cotn labetolol increase norvasc to 10 mg daily    3) Gout - cont colchicine

## 2019-05-07 NOTE — PROGRESS NOTE ADULT - PROBLEM SELECTOR PLAN 2
Pt c/o Dark stools since last 2 weeks   - FOBT -ve x 2  - H/h stable this am   - c/w PPI Oral BID for now  - Plan for EGD on Wednesday  - NPO after MN, gentle hydration   - GI consulted Dr Diaz

## 2019-05-07 NOTE — CONSULT NOTE ADULT - SUBJECTIVE AND OBJECTIVE BOX
Patient is a 63y old  Male who presents with a chief complaint of Joints pain (06 May 2019 11:22)    HPI: 63M presents with a chief complaint of melena x 2 weeks. The patient has a significant past medical history of Gout.  Admitted for gout flare, currently on Prednisone 20 mg BID and Colchicine. Patient mentions having loose black colored stools x 2 weeks. Denies abdominal pain, hematemesis/hematochezia, wt loss, family Hx colon CA or similar episodes in the past.  Patient has never had EGD nor colonoscopy. Patient is a current smoker (1PPD x 30 yrs). Reports taking Prednisone daily 8 yrs ago, and that he only uses Percocet and Oxycodone for pain control. Patient currently takes ASA 81 mg QD.  Patient had melena x1 today and yesterday.    REVIEW OF SYSTEMS  Constitutional:   No fever, no fatigue, no pallor, no night sweats, no weight loss.  HEENT:   No eye pain, no vision changes, no icterus, no mouth ulcers.  Respiratory:   No shortness of breath, no cough, no respiratory distress.   Cardiovascular:   No chest pain, no palpitations.   Gastrointestinal: No abdominal pain, no nausea, no vomiting , no diahrrea, no constipation, no hematochezia,no melena.  Skin:   No rashes, no jaundice, no eczema.   Musculoskeletal:   No joint pain, no swelling, no myalgia.   Neurologic:   No headache, no seizure, no weakness.   Genitourinary:   No dysuria, no decreased urine output.  Psychiatric:  No depression, no anxiety,   Endocrine:   No thyroid disease, no diabetes.  Heme/Lymphatic:   No anemia, no blood transfusions, no lymph node enlargement, no bleeding, no bruising.  ___________________________________________________________________________________________  Allergies    Diovan (Rash)    Intolerances      MEDICATIONS  (STANDING):  amLODIPine   Tablet 5 milliGRAM(s) Oral daily  aspirin enteric coated 81 milliGRAM(s) Oral daily  atorvastatin 40 milliGRAM(s) Oral at bedtime  colchicine 0.6 milliGRAM(s) Oral two times a day  enoxaparin Injectable 40 milliGRAM(s) SubCutaneous daily  insulin lispro (HumaLOG) corrective regimen sliding scale   SubCutaneous Before meals and at bedtime  labetalol 100 milliGRAM(s) Oral two times a day  nystatin Powder 1 Application(s) Topical two times a day  predniSONE   Tablet 20 milliGRAM(s) Oral two times a day    MEDICATIONS  (PRN):  acetaminophen   Tablet .. 650 milliGRAM(s) Oral every 6 hours PRN Mild Pain (1 - 3)  benzocaine 15 mG/menthol 3.6 mG Lozenge 1 Lozenge Oral four times a day PRN Sore Throat      PAST MEDICAL & SURGICAL HISTORY:  Hemiplegia  Falls frequently: cervical stenosis &amp; back pain  Cervical stenosis of spine  Cholelithiasis  FRIEDA (obstructive sleep apnea): not on machine  Epicondylitis, lateral (tennis elbow), left  Diabetic Neuropathy  Gout  Diabetes Mellitus Type II  Hyperlipidemia  HTN (Hypertension)  Coronary Stent: &quot;x1&quot;  name of hospital not sure  CAD (Coronary Artery Disease): cad   History of excision of mass: elbow mass excision   Carpal tunnel syndrome on both sides: s/p carpal tunner release - right 2013, left 2014  Knee osteoarthritis: s/p left total knee replacement , September  [facility]    FAMILY HISTORY:  Family history of cancer (Sibling): brother, sister  of cancer    Social History: No hsitory of : Tobacco use, IVDA, EToH  ______________________________________________________________________________________    PHYSICAL EXAM    Daily     Daily Weight in k (05 May 2019 19:37)  BMI: 28.2 ( @ 08:11)  Change in Weight:  Vital Signs Last 24 Hrs  T(C): 36.8 (06 May 2019 09:07), Max: 37.1 (06 May 2019 00:26)  T(F): 98.3 (06 May 2019 09:07), Max: 98.7 (06 May 2019 00:26)  HR: 62 (06 May 2019 09:07) (62 - 75)  BP: 134/81 (06 May 2019 09:07) (134/81 - 172/99)  BP(mean): --  RR: 17 (06 May 2019 09:07) (17 - 19)  SpO2: 99% (06 May 2019 09:07) (93% - 99%)    General:  Well developed, well nourished, alert and active, no pallor, NAD.  HEENT:    Normal appearance of conjunctiva, ears, nose, lips, oropharynx, and oral mucosa, anicteric.  Neck:  No masses, no asymmetry.  Lymph Nodes:  No lymphadenopathy.   Cardiovascular:  RRR normal S1/S2, no murmur.  Respiratory:  CTA B/L, normal respiratory effort.   Abdominal:   soft, no masses or tenderness, normoactive BS, NT/ND, no HSM.  Extremities:   No clubbing or cyanosis, normal capillary refill, no edema.   Skin:   No rash, jaundice, lesions, eczema.   Musculoskeletal:  No joint swelling, erythema or tenderness.   Neuro: No focal deficits.   Other:   _______________________________________________________________________________________________  Lab Results:                          12.6   5.41  )-----------( 282      ( 05 May 2019 06:03 )             37.3     05-05    137  |  104  |  15  ----------------------------<  96  3.5   |  28  |  0.86    Ca    8.8      05 May 2019 06:03  Phos  4.4     05-05  Mg     2.0     05-05    TPro  7.2  /  Alb  3.3<L>  /  TBili  0.2  /  DBili  x   /  AST  12  /  ALT  16  /  AlkPhos  65  05-05    LIVER FUNCTIONS - ( 05 May 2019 06:03 )  Alb: 3.3 g/dL / Pro: 7.2 g/dL / ALK PHOS: 65 U/L / ALT: 16 U/L DA / AST: 12 U/L / GGT: x                   Stool Results:          RADIOLOGY RESULTS:    SURGICAL PATHOLOGY:
Lucien Rudolph MD  Interventional Cardiology / Advance Heart Failure and Cardiac Transplant Specialist  [place] Office : [address] [place] [address] [place] [address]  Tel:   [place] Office : [address] [facility] [place] [address]  Tel: [phone]  Cell : [phone]    HISTORY OF PRESENT ILLNESS:  63 Male with PMH of HTN, Gout, Pre-DM, CAD (1 stent in ), Cervical radiculopathy (s/p surgery in ) and Rt Hemiplegia due to cervical spine surgery came to hospital for diffuse pain and swelling of hand and foot joints. Symptoms has been happening for 3 weeks. Pt states, usually his joint pain resolves within few days. Last gout flare was 5 years ago. Recently he had to change his insurance which made him switch from colchicine to allopurinol. Pt has noticed dry rash in his chest, hands, scrotum and dark diarrhea for 2 weeks. No fever, chest pain, dryness of oral cavity, morning stiffness, problems with urine or bowel, sexual activity.    As per pt has PCI in  no chest pains or SOB since then, good exercise tolerance       PAST MEDICAL & SURGICAL HISTORY:  Hemiplegia  Falls frequently: cervical stenosis &amp; back pain  Cervical stenosis of spine  Cholelithiasis  FRIEDA (obstructive sleep apnea): not on machine  Epicondylitis, lateral (tennis elbow), left  Diabetic Neuropathy  Gout  Diabetes Mellitus Type II  Hyperlipidemia  HTN (Hypertension)  Coronary Stent: &quot;x1&quot;  name of hospital not sure  CAD (Coronary Artery Disease): cad   History of excision of mass: elbow mass excision   Carpal tunnel syndrome on both sides: s/p carpal tunner release - right 2013, left 2014  Knee osteoarthritis: s/p left total knee replacement , September  [facility]	    MEDICATIONS:  amLODIPine   Tablet 5 milliGRAM(s) Oral daily  aspirin enteric coated 81 milliGRAM(s) Oral daily  enoxaparin Injectable 40 milliGRAM(s) SubCutaneous daily  labetalol 100 milliGRAM(s) Oral two times a day  acetaminophen   Tablet .. 650 milliGRAM(s) Oral every 6 hours PRN  pantoprazole    Tablet 40 milliGRAM(s) Oral two times a day  atorvastatin 40 milliGRAM(s) Oral at bedtime  colchicine 0.6 milliGRAM(s) Oral two times a day  insulin lispro (HumaLOG) corrective regimen sliding scale   SubCutaneous Before meals and at bedtime  insulin lispro (HumaLOG) corrective regimen sliding scale   SubCutaneous every 6 hours  predniSONE   Tablet 20 milliGRAM(s) Oral two times a day  benzocaine 15 mG/menthol 3.6 mG Lozenge 1 Lozenge Oral four times a day PRN  dextrose 5% + sodium chloride 0.9%. 1000 milliLiter(s) IV Continuous <Continuous>  nystatin Powder 1 Application(s) Topical two times a day  potassium chloride    Tablet ER 40 milliEquivalent(s) Oral every 4 hours    FAMILY HISTORY:  Family history of cancer (Sibling): brother, sister  of cancer        Allergies    Diovan (Rash)    Intolerances    	      PHYSICAL EXAM:  T(C): 36.6 (19 @ 08:34), Max: 36.8 (19 @ 23:57)  HR: 65 (19 @ 08:34) (56 - 76)  BP: 164/78 (19 @ 08:34) (150/77 - 164/78)  RR: 16 (19 @ 08:34) (16 - 17)  SpO2: 100% (19 @ 08:34) (100% - 100%)  Wt(kg): --  I&O's Summary    	  HEENT:   Normal oral mucosa, PERRL, EOMI	  Cardiovascular: Normal S1 S2, No JVD, No murmurs, No edema  Respiratory: Lungs clear to auscultation	  Gastrointestinal:  Soft, Non-tender, + BS	  Extremities: Normal range of motion, No clubbing, cyanosis or edema    LABS:	 	    CARDIAC MARKERS:                                  13.1   7.86  )-----------( 334      ( 07 May 2019 06:21 )             39.1     05-07    138  |  105  |  13  ----------------------------<  94  3.3<L>   |  27  |  0.83    Ca    8.7      07 May 2019 06:21  Phos  3.4     05-07  Mg     2.2     05-07      proBNP:   Lipid Profile:   HgA1c:   TSH:     ASSESSMENT/PLAN:

## 2019-05-07 NOTE — PROGRESS NOTE ADULT - ASSESSMENT
63M w/ PMHx Gout, current smoker c/o melena x 2 weeks. Reporting melena x3 overnight. Hemodynamically stable. FOBT -ve x2.    Plan for EGD tomorrow. Keep NPO after midnight. Clear liquid diet today. c/w PPI BID. Monitor CBC q12hrs for now.

## 2019-05-07 NOTE — PROGRESS NOTE ADULT - ASSESSMENT
63 Male with PMH of HTN, Gout, Pre-DM, CAD (1 stent in 2008), Cervical radiculopathy (s/p surgery in 2016) and Rt Hemiplegia due to cervical spine surgery came to hospital for diffuse pain and swelling of hand and foot joints. Admitted to medicine for gout flare.      Problem/Plan - 1:  ·  Problem: Acute Gout attack with Polyarthritis .  Plan: Rt elbow pain, Left hand 1st interphalangeal joints pain/swelling, Rt ankle pain  Swollen and redness of joints   Serum uric acid level WNL but was taking Allopurinol .Noted. RF and DO.   On  Prednisone and Colchicine   Neg Blood Cx .  X rays noted. Erosions confirming Gout .    Problem/Plan - 2:  ·  Problem: HTN (hypertension).  Plan: Restart home meds.      Problem/Plan - 3:  ·  Problem: CAD (coronary artery disease).  Plan: Continue home meds. Cardiology to see pt.      Problem/Plan - 4:  ·  Problem: Acute Diarrhea .  Plan: HH stable . GI helping and planning EGD tomorrow.       Problem/Plan - 5:  ·  Problem: Prediabetes.  Plan: Sugars and hGBA1C fine.     Problem/Plan - 6:  ·  Problem: Need for prophylactic measure.  Plan: Improve score 2  Lovenox sq.     Disposition : DC planning to [facility] pending above.

## 2019-05-08 ENCOUNTER — RESULT REVIEW (OUTPATIENT)
Age: 64
End: 2019-05-08

## 2019-05-08 ENCOUNTER — TRANSCRIPTION ENCOUNTER (OUTPATIENT)
Age: 64
End: 2019-05-08

## 2019-05-08 LAB
ANION GAP SERPL CALC-SCNC: 10 MMOL/L — SIGNIFICANT CHANGE UP (ref 5–17)
BASOPHILS # BLD AUTO: 0.02 K/UL — SIGNIFICANT CHANGE UP (ref 0–0.2)
BASOPHILS NFR BLD AUTO: 0.3 % — SIGNIFICANT CHANGE UP (ref 0–2)
BUN SERPL-MCNC: 12 MG/DL — SIGNIFICANT CHANGE UP (ref 7–18)
CALCIUM SERPL-MCNC: 8.6 MG/DL — SIGNIFICANT CHANGE UP (ref 8.4–10.5)
CHLORIDE SERPL-SCNC: 106 MMOL/L — SIGNIFICANT CHANGE UP (ref 96–108)
CO2 SERPL-SCNC: 26 MMOL/L — SIGNIFICANT CHANGE UP (ref 22–31)
CREAT SERPL-MCNC: 0.8 MG/DL — SIGNIFICANT CHANGE UP (ref 0.5–1.3)
EOSINOPHIL # BLD AUTO: 0.01 K/UL — SIGNIFICANT CHANGE UP (ref 0–0.5)
EOSINOPHIL NFR BLD AUTO: 0.1 % — SIGNIFICANT CHANGE UP (ref 0–6)
GLUCOSE BLDC GLUCOMTR-MCNC: 127 MG/DL — HIGH (ref 70–99)
GLUCOSE BLDC GLUCOMTR-MCNC: 176 MG/DL — HIGH (ref 70–99)
GLUCOSE BLDC GLUCOMTR-MCNC: 86 MG/DL — SIGNIFICANT CHANGE UP (ref 70–99)
GLUCOSE BLDC GLUCOMTR-MCNC: 99 MG/DL — SIGNIFICANT CHANGE UP (ref 70–99)
GLUCOSE SERPL-MCNC: 85 MG/DL — SIGNIFICANT CHANGE UP (ref 70–99)
HCT VFR BLD CALC: 38.8 % — LOW (ref 39–50)
HGB BLD-MCNC: 13.1 G/DL — SIGNIFICANT CHANGE UP (ref 13–17)
IMM GRANULOCYTES NFR BLD AUTO: 0.4 % — SIGNIFICANT CHANGE UP (ref 0–1.5)
INR BLD: 1.1 RATIO — SIGNIFICANT CHANGE UP (ref 0.88–1.16)
LYMPHOCYTES # BLD AUTO: 2 K/UL — SIGNIFICANT CHANGE UP (ref 1–3.3)
LYMPHOCYTES # BLD AUTO: 25 % — SIGNIFICANT CHANGE UP (ref 13–44)
MAGNESIUM SERPL-MCNC: 2.2 MG/DL — SIGNIFICANT CHANGE UP (ref 1.6–2.6)
MCHC RBC-ENTMCNC: 32.2 PG — SIGNIFICANT CHANGE UP (ref 27–34)
MCHC RBC-ENTMCNC: 33.8 GM/DL — SIGNIFICANT CHANGE UP (ref 32–36)
MCV RBC AUTO: 95.3 FL — SIGNIFICANT CHANGE UP (ref 80–100)
MONOCYTES # BLD AUTO: 0.42 K/UL — SIGNIFICANT CHANGE UP (ref 0–0.9)
MONOCYTES NFR BLD AUTO: 5.3 % — SIGNIFICANT CHANGE UP (ref 2–14)
NEUTROPHILS # BLD AUTO: 5.52 K/UL — SIGNIFICANT CHANGE UP (ref 1.8–7.4)
NEUTROPHILS NFR BLD AUTO: 68.9 % — SIGNIFICANT CHANGE UP (ref 43–77)
NRBC # BLD: 0 /100 WBCS — SIGNIFICANT CHANGE UP (ref 0–0)
PHOSPHATE SERPL-MCNC: 3.2 MG/DL — SIGNIFICANT CHANGE UP (ref 2.5–4.5)
PLATELET # BLD AUTO: 301 K/UL — SIGNIFICANT CHANGE UP (ref 150–400)
POTASSIUM SERPL-MCNC: 3.8 MMOL/L — SIGNIFICANT CHANGE UP (ref 3.5–5.3)
POTASSIUM SERPL-SCNC: 3.8 MMOL/L — SIGNIFICANT CHANGE UP (ref 3.5–5.3)
PROTHROM AB SERPL-ACNC: 12.2 SEC — SIGNIFICANT CHANGE UP (ref 10–12.9)
RBC # BLD: 4.07 M/UL — LOW (ref 4.2–5.8)
RBC # FLD: 11.3 % — SIGNIFICANT CHANGE UP (ref 10.3–14.5)
SODIUM SERPL-SCNC: 142 MMOL/L — SIGNIFICANT CHANGE UP (ref 135–145)
WBC # BLD: 8 K/UL — SIGNIFICANT CHANGE UP (ref 3.8–10.5)
WBC # FLD AUTO: 8 K/UL — SIGNIFICANT CHANGE UP (ref 3.8–10.5)

## 2019-05-08 PROCEDURE — 43239 EGD BIOPSY SINGLE/MULTIPLE: CPT

## 2019-05-08 PROCEDURE — 88312 SPECIAL STAINS GROUP 1: CPT | Mod: 26

## 2019-05-08 PROCEDURE — 88305 TISSUE EXAM BY PATHOLOGIST: CPT | Mod: 26

## 2019-05-08 RX ORDER — AMLODIPINE BESYLATE 2.5 MG/1
10 TABLET ORAL DAILY
Qty: 0 | Refills: 0 | Status: DISCONTINUED | OUTPATIENT
Start: 2019-05-08 | End: 2019-05-09

## 2019-05-08 RX ORDER — PANTOPRAZOLE SODIUM 20 MG/1
40 TABLET, DELAYED RELEASE ORAL
Qty: 0 | Refills: 0 | Status: DISCONTINUED | OUTPATIENT
Start: 2019-05-08 | End: 2019-05-09

## 2019-05-08 RX ADMIN — Medication 100 MILLIGRAM(S): at 17:13

## 2019-05-08 RX ADMIN — NYSTATIN CREAM 1 APPLICATION(S): 100000 CREAM TOPICAL at 06:12

## 2019-05-08 RX ADMIN — Medication 20 MILLIGRAM(S): at 06:11

## 2019-05-08 RX ADMIN — PANTOPRAZOLE SODIUM 40 MILLIGRAM(S): 20 TABLET, DELAYED RELEASE ORAL at 06:11

## 2019-05-08 RX ADMIN — PANTOPRAZOLE SODIUM 40 MILLIGRAM(S): 20 TABLET, DELAYED RELEASE ORAL at 17:13

## 2019-05-08 RX ADMIN — NYSTATIN CREAM 1 APPLICATION(S): 100000 CREAM TOPICAL at 17:13

## 2019-05-08 RX ADMIN — ATORVASTATIN CALCIUM 40 MILLIGRAM(S): 80 TABLET, FILM COATED ORAL at 21:51

## 2019-05-08 RX ADMIN — Medication 81 MILLIGRAM(S): at 11:56

## 2019-05-08 RX ADMIN — Medication 0.6 MILLIGRAM(S): at 06:11

## 2019-05-08 RX ADMIN — Medication 20 MILLIGRAM(S): at 17:13

## 2019-05-08 NOTE — CHART NOTE - NSCHARTNOTEFT_GEN_A_CORE
Esophagogastroduodenoscopy Report  Indication:  Black stools   Referring MD: Dr. Cruz      Instrument:  #[id number]  Anesthesia: MAC  Consent:  Informed consent was obtained from the patient after providing any opportunity for questions  Procedure: The gastroscope was gently passed through the incisoral orifice into the oral cavity and under direct visualization the esophagus was intubated. The endoscope was passed down the esophagus, through the stomach and into the 3rd portion. Color, texture, mucosa and anatomy of the esophagus, stomach, and duodenum were carefully examined with the scope. The patient tolerated the procedure well. After completion of the examination, the patient was transferred to the recovery room.   Preparation: NPO   Findings:   Oropharynx	Normal  Esophagus	Normal.  EG-junction	Normal appearing z-line at 37 cm from the incisors.   Cardia	Normal  Body	Mild erythema and edema. Random biopsy taken. [1]  Antrum	Mild erythema and edema. Random biopsy taken [1]  Pylorus	Normal.  Duodenal Bulb	Normal.  2nd portion	Normal  3rd portion	Normal.  Date and time: 5/8/2019 10:42:53 AM  EBL:0  Impression: 1- Mild gastritis 2-No pathology to explain dark stools      Plan: 1- Resume diet and medications 2- Colonoscopy as an outpatient 3- PPI daily for 1 month 4- Follow up pathology     Procedure Start Time: 10:37 am   Procedure End Time:    10:40 am      Attending:       George Rawls M.D.   Date and Time: 5/8/2019 10:42:53 AM

## 2019-05-08 NOTE — PROGRESS NOTE ADULT - PROBLEM SELECTOR PLAN 2
Pt c/o Dark stools since last 2 weeks   - FOBT -ve x 2  - H/h stable this am   - c/w PPI Oral BID for now  - Plan for EGD on Wednesday  - NPO after MN, gentle hydration   - GI consulted Dr Diaz Pt c/o Dark stools since last 2 weeks   - FOBT -ve x 2  - H/h stable  - EGD : gastritis   -c/w PPI daily  - no more diarrhea today   - f/u GI PCR , ova/parasite, Stool Cx ,   - GI consulted Dr Diaz

## 2019-05-08 NOTE — DISCHARGE NOTE PROVIDER - NSDCCPCAREPLAN_GEN_ALL_CORE_FT
PRINCIPAL DISCHARGE DIAGNOSIS  Diagnosis: Gout flare  Assessment and Plan of Treatment: You came with diffuse swelling of the hand and foot joints. You were diagnosed with Gout flare . xray of the hand and leg was consistent with Gout. You were treated with Steroids and colchicine. Your symptoms have improved now. Physical therapist recommended Sub acute rehab for you .  So will be discharged to Rehab facility   keep taking the medications as prescribed   Follow up with your Primary Care Doctor in 1-2 weeks.      SECONDARY DISCHARGE DIAGNOSES  Diagnosis: Dark stools  Assessment and Plan of Treatment: You complained of dark stools. YOu were seen by the gastroenerologist. Endoscopy was done which showed only Gastritis .   Continue taking the protonix daily.   Follow up with gastroenterologist in 1-2 weeks for Colononscopy    Diagnosis: HTN (hypertension)  Assessment and Plan of Treatment: You have history of HTN. You amlodipine dose was increased  keep taking the medications as prescribed   Follow up with your Primary Care Doctor in 1-2 weeks.    Diagnosis: CAD (coronary atherosclerotic disease)  Assessment and Plan of Treatment: Continue taking the aspirin and statin as prescribed    Diagnosis: Prediabetes  Assessment and Plan of Treatment: You have history of Prediabetes .  keep taking the medications as prescribed   Follow up with your Primary Care Doctor in 1-2 weeks. PRINCIPAL DISCHARGE DIAGNOSIS  Diagnosis: Gout flare  Assessment and Plan of Treatment: You came with diffuse swelling of the hand and foot joints. You were diagnosed with Gout flare . xray of the hand and leg was consistent with Gout. You were treated with Steroids and colchicine. Your symptoms have improved now. Physical therapist recommended Sub acute rehab for you .So will be discharged to Rehab facility   Continue taking the Colchicine and prednisone . Will taper prednisone. Once the symptoms are resolved , Allopurinol should be added and then colchicine can be stopped after few days.    Follow up with your Primary Care Doctor in 1-2 weeks.      SECONDARY DISCHARGE DIAGNOSES  Diagnosis: Dark stools  Assessment and Plan of Treatment: You complained of dark stools. YOu were seen by the gastroenerologist. Endoscopy was done which showed only Gastritis .   Continue taking the protonix daily.   Follow up with gastroenterologist in 1-2 weeks for Colononscopy    Diagnosis: HTN (hypertension)  Assessment and Plan of Treatment: You have history of HTN. You amlodipine dose was increased  keep taking the medications as prescribed   Follow up with your Primary Care Doctor in 1-2 weeks.    Diagnosis: CAD (coronary atherosclerotic disease)  Assessment and Plan of Treatment: Continue taking the aspirin and statin as prescribed    Diagnosis: Prediabetes  Assessment and Plan of Treatment: You have history of Prediabetes .  keep taking the medications as prescribed   Follow up with your Primary Care Doctor in 1-2 weeks.

## 2019-05-08 NOTE — DISCHARGE NOTE PROVIDER - HOSPITAL COURSE
63 Male with PMH of HTN, Gout, Pre-DM, CAD (1 stent in 2008), Cervical radiculopathy (s/p surgery in 2016) and Rt Hemiplegia due to cervical spine surgery came to hospital for diffuse pain and swelling of hand and foot joints.     ED Course: Hemodynamically stable. Labs showed ESR of 62. Ct abdomen was normal.     Pt admitted to medicine floor for Acute Gout flare.     Pt was treated with prednisone and colchicine. Xray shows evidence of Gout. RF and DO were -ve Symptoms improved now.     Pt also c/o diarrhea and dark stools since last 2 weeks . FOBT was -ve x 2 . EGD was done which showed only gastritis .     Diarrhea can be from colchicine . Stool studies were sent .          PT recc [facility]         Pt stable for discharge to [facility] as per attending 63 Male with PMH of HTN, Gout, Pre-DM, CAD (1 stent in 2008), Cervical radiculopathy (s/p surgery in 2016) and Rt Hemiplegia due to cervical spine surgery came to hospital for diffuse pain and swelling of hand and foot joints.     ED Course: Hemodynamically stable. Labs showed ESR of 62. Ct abdomen was normal.     Pt admitted to medicine floor for Acute Gout flare.     Pt was treated with prednisone and colchicine. Xray shows evidence of Gout. RF and DO were -ve Symptoms improved now.     Pt also c/o diarrhea and dark stools since last 2 weeks . FOBT was -ve x 2 . EGD was done which showed only gastritis .     Diarrhea can be from colchicine . Stool studies negative so far , Diarrhea resolved     Pt will be discharged with Colchicine and prednisone          PT recc [facility]         Pt stable for discharge to [facility] as per attending

## 2019-05-08 NOTE — PROGRESS NOTE ADULT - ASSESSMENT
63 Male with PMH of HTN, Gout, Pre-DM, CAD (1 stent in 2008), Cervical radiculopathy (s/p surgery in 2016) and Rt Hemiplegia due to cervical spine surgery came to hospital for diffuse pain and swelling of hand and foot joints. Admitted to medicine for gout flare.      Problem/Plan - 1:  ·  Problem: Acute Gout attack with Polyarthritis .  Plan: Rt elbow pain, Left hand 1st interphalangeal joints pain/swelling, Rt ankle pain  Swollen and redness of joints   Serum uric acid level WNL but was taking Allopurinol .Noted. RF and DO.   On  Prednisone and Colchicine and will slowly taper as outpt.   Neg Blood Cx .  X rays noted. Erosions confirming Gout .    Problem/Plan - 2:  ·  Problem: HTN (hypertension).  Plan: Restart home meds.      Problem/Plan - 3:  ·  Problem: CAD (coronary artery disease).  Plan: Continue home meds. Cardiology to see pt.      Problem/Plan - 4:  ·  Problem: Acute Diarrhea .  Plan: Resolved.  HH stable . GI helping and S/P EGD.       Problem/Plan - 5:  ·  Problem: Prediabetes.  Plan: Sugars and hGBA1C fine.     Problem/Plan - 6:  ·  Problem: Need for prophylactic measure.  Plan: Improve score 2  Lovenox sq.     Disposition : DC planning to [facility].

## 2019-05-08 NOTE — PROGRESS NOTE ADULT - ASSESSMENT
a/p     1) CAD s/p PCI - cont asa no cp no SOB no in pt workup needed    2) HTN - cont labetolol iand norvasc  10 mg daily    3) Gout - cont colchicine

## 2019-05-08 NOTE — PROGRESS NOTE ADULT - PROBLEM SELECTOR PLAN 4
Pt takes aspirin and plavix  Plavix can be held as stent is old 2008   c/w Aspirin .started on statins Pt takes aspirin and plavix  Plavix can be held as stent is old 2008   c/w Aspirin and statin

## 2019-05-08 NOTE — DISCHARGE NOTE PROVIDER - CARE PROVIDER_API CALL
George Rawls)  Gastroenterology; Internal Medicine  [address] [place] [address]  Phone: [phone]  Fax: [phone]  Follow Up Time:

## 2019-05-09 ENCOUNTER — TRANSCRIPTION ENCOUNTER (OUTPATIENT)
Age: 64
End: 2019-05-09

## 2019-05-09 VITALS
HEART RATE: 66 BPM | RESPIRATION RATE: 18 BRPM | OXYGEN SATURATION: 100 % | TEMPERATURE: 98 F | SYSTOLIC BLOOD PRESSURE: 148 MMHG | DIASTOLIC BLOOD PRESSURE: 82 MMHG

## 2019-05-09 LAB
CULTURE RESULTS: SIGNIFICANT CHANGE UP
GLUCOSE BLDC GLUCOMTR-MCNC: 103 MG/DL — HIGH (ref 70–99)
GLUCOSE BLDC GLUCOMTR-MCNC: 120 MG/DL — HIGH (ref 70–99)
HCT VFR BLD CALC: 39.8 % — SIGNIFICANT CHANGE UP (ref 39–50)
HGB BLD-MCNC: 13.6 G/DL — SIGNIFICANT CHANGE UP (ref 13–17)
MCHC RBC-ENTMCNC: 32 PG — SIGNIFICANT CHANGE UP (ref 27–34)
MCHC RBC-ENTMCNC: 34.2 GM/DL — SIGNIFICANT CHANGE UP (ref 32–36)
MCV RBC AUTO: 93.6 FL — SIGNIFICANT CHANGE UP (ref 80–100)
NRBC # BLD: 0 /100 WBCS — SIGNIFICANT CHANGE UP (ref 0–0)
PLATELET # BLD AUTO: 297 K/UL — SIGNIFICANT CHANGE UP (ref 150–400)
RBC # BLD: 4.25 M/UL — SIGNIFICANT CHANGE UP (ref 4.2–5.8)
RBC # FLD: 11.2 % — SIGNIFICANT CHANGE UP (ref 10.3–14.5)
SPECIMEN SOURCE: SIGNIFICANT CHANGE UP
WBC # BLD: 8.4 K/UL — SIGNIFICANT CHANGE UP (ref 3.8–10.5)
WBC # FLD AUTO: 8.4 K/UL — SIGNIFICANT CHANGE UP (ref 3.8–10.5)

## 2019-05-09 PROCEDURE — 87045 FECES CULTURE AEROBIC BACT: CPT

## 2019-05-09 PROCEDURE — 96374 THER/PROPH/DIAG INJ IV PUSH: CPT | Mod: XU

## 2019-05-09 PROCEDURE — 80048 BASIC METABOLIC PNL TOTAL CA: CPT

## 2019-05-09 PROCEDURE — 86803 HEPATITIS C AB TEST: CPT

## 2019-05-09 PROCEDURE — 83605 ASSAY OF LACTIC ACID: CPT

## 2019-05-09 PROCEDURE — 82746 ASSAY OF FOLIC ACID SERUM: CPT

## 2019-05-09 PROCEDURE — 87040 BLOOD CULTURE FOR BACTERIA: CPT

## 2019-05-09 PROCEDURE — 83690 ASSAY OF LIPASE: CPT

## 2019-05-09 PROCEDURE — 74177 CT ABD & PELVIS W/CONTRAST: CPT

## 2019-05-09 PROCEDURE — 80053 COMPREHEN METABOLIC PANEL: CPT

## 2019-05-09 PROCEDURE — 83036 HEMOGLOBIN GLYCOSYLATED A1C: CPT

## 2019-05-09 PROCEDURE — 82607 VITAMIN B-12: CPT

## 2019-05-09 PROCEDURE — 80061 LIPID PANEL: CPT

## 2019-05-09 PROCEDURE — 86038 ANTINUCLEAR ANTIBODIES: CPT

## 2019-05-09 PROCEDURE — 84443 ASSAY THYROID STIM HORMONE: CPT

## 2019-05-09 PROCEDURE — 97110 THERAPEUTIC EXERCISES: CPT

## 2019-05-09 PROCEDURE — 87507 IADNA-DNA/RNA PROBE TQ 12-25: CPT

## 2019-05-09 PROCEDURE — 88312 SPECIAL STAINS GROUP 1: CPT

## 2019-05-09 PROCEDURE — 73120 X-RAY EXAM OF HAND: CPT

## 2019-05-09 PROCEDURE — 97162 PT EVAL MOD COMPLEX 30 MIN: CPT

## 2019-05-09 PROCEDURE — 87177 OVA AND PARASITES SMEARS: CPT

## 2019-05-09 PROCEDURE — 99285 EMERGENCY DEPT VISIT HI MDM: CPT | Mod: 25

## 2019-05-09 PROCEDURE — 97530 THERAPEUTIC ACTIVITIES: CPT

## 2019-05-09 PROCEDURE — 86431 RHEUMATOID FACTOR QUANT: CPT

## 2019-05-09 PROCEDURE — 85652 RBC SED RATE AUTOMATED: CPT

## 2019-05-09 PROCEDURE — 73620 X-RAY EXAM OF FOOT: CPT

## 2019-05-09 PROCEDURE — 85027 COMPLETE CBC AUTOMATED: CPT

## 2019-05-09 PROCEDURE — 84550 ASSAY OF BLOOD/URIC ACID: CPT

## 2019-05-09 PROCEDURE — 87046 STOOL CULTR AEROBIC BACT EA: CPT

## 2019-05-09 PROCEDURE — 84100 ASSAY OF PHOSPHORUS: CPT

## 2019-05-09 PROCEDURE — 82272 OCCULT BLD FECES 1-3 TESTS: CPT

## 2019-05-09 PROCEDURE — 82962 GLUCOSE BLOOD TEST: CPT

## 2019-05-09 PROCEDURE — 88305 TISSUE EXAM BY PATHOLOGIST: CPT

## 2019-05-09 PROCEDURE — 85610 PROTHROMBIN TIME: CPT

## 2019-05-09 PROCEDURE — 83735 ASSAY OF MAGNESIUM: CPT

## 2019-05-09 PROCEDURE — 36415 COLL VENOUS BLD VENIPUNCTURE: CPT

## 2019-05-09 RX ORDER — NYSTATIN CREAM 100000 [USP'U]/G
1 CREAM TOPICAL
Qty: 1 | Refills: 0
Start: 2019-05-09 | End: 2019-06-07

## 2019-05-09 RX ORDER — COLCHICINE 0.6 MG
1 TABLET ORAL
Qty: 30 | Refills: 0
Start: 2019-05-09 | End: 2019-05-23

## 2019-05-09 RX ORDER — LABETALOL HCL 100 MG
1 TABLET ORAL
Qty: 0 | Refills: 0 | DISCHARGE

## 2019-05-09 RX ORDER — AMLODIPINE BESYLATE 2.5 MG/1
1 TABLET ORAL
Qty: 30 | Refills: 0
Start: 2019-05-09 | End: 2019-06-07

## 2019-05-09 RX ORDER — ATORVASTATIN CALCIUM 80 MG/1
1 TABLET, FILM COATED ORAL
Qty: 30 | Refills: 0
Start: 2019-05-09 | End: 2019-06-07

## 2019-05-09 RX ORDER — COLCHICINE 0.6 MG
0.6 TABLET ORAL
Refills: 0 | Status: DISCONTINUED | OUTPATIENT
Start: 2019-05-09 | End: 2019-05-09

## 2019-05-09 RX ORDER — PANTOPRAZOLE SODIUM 20 MG/1
1 TABLET, DELAYED RELEASE ORAL
Qty: 30 | Refills: 0
Start: 2019-05-09 | End: 2019-06-07

## 2019-05-09 RX ADMIN — PANTOPRAZOLE SODIUM 40 MILLIGRAM(S): 20 TABLET, DELAYED RELEASE ORAL at 05:33

## 2019-05-09 RX ADMIN — Medication 20 MILLIGRAM(S): at 05:33

## 2019-05-09 RX ADMIN — Medication 100 MILLIGRAM(S): at 05:33

## 2019-05-09 RX ADMIN — AMLODIPINE BESYLATE 10 MILLIGRAM(S): 2.5 TABLET ORAL at 05:33

## 2019-05-09 RX ADMIN — Medication 81 MILLIGRAM(S): at 12:18

## 2019-05-09 RX ADMIN — NYSTATIN CREAM 1 APPLICATION(S): 100000 CREAM TOPICAL at 05:33

## 2019-05-09 NOTE — PROGRESS NOTE ADULT - PROBLEM SELECTOR PLAN 1
Rt elbow pain, Left hand 1st interphalangeal joints pain/swelling, Rt ankle pain  Swollen and redness of joints   Serum uric acid level WNL but was taking Allopurinol.  - Xray : evidence of gout   -pain and swelling improving   - c/w Prednisone 20 Mg BID and Colchicine 0.6 mg BID   - F/u BCx, DO and RF
Rt elbow pain, Left hand 1st interphalangeal joints pain/swelling, Rt ankle pain  Swollen and redness of joints   Serum uric acid level WNL but was taking Allopurinol.  - RF and DO -ve   - Xray : evidence of gout   -pain and swelling improving   - c/w Prednisone 20 Mg BID and Colchicine 0.6 mg BID   - BCx NGTD ,
Rt elbow pain, Left hand 1st interphalangeal joints pain/swelling, Rt ankle pain  Swollen and redness of joints   Serum uric acid level WNL but was taking Allopurinol.  - RF and DO -ve   - Xray : evidence of gout   -pain and swelling improving   - c/w Prednisone 20 Mg BID and Colchicine 0.6 mg BID, will titrate down the steroids on discharge  - BCx NGTD ,
Rt elbow pain, Left hand 1st interphalangeal joints pain/swelling, Rt ankle pain  Swollen and redness of joints   Serum uric acid level WNL but was taking Allopurinol.  - Xray : evidence of gout   -pain and swelling improving   - c/w Prednisone 20 Mg BID and Colchicine 0.6 mg BID   - BCx NGTD , RF -ve   - f/u DO

## 2019-05-09 NOTE — PROGRESS NOTE ADULT - PROVIDER SPECIALTY LIST ADULT
Cardiology
Cardiology
Gastroenterology
Internal Medicine

## 2019-05-09 NOTE — PROGRESS NOTE ADULT - PROBLEM SELECTOR PLAN 3
Pt takes labetalol and amlodipine at home   -c/w Amlodipine and Labetalol

## 2019-05-09 NOTE — PROGRESS NOTE ADULT - SUBJECTIVE AND OBJECTIVE BOX
INTERVAL HPI/OVERNIGHT EVENTS: Had one Loose stool .   Vital Signs Last 24 Hrs  T(C): 36.6 (07 May 2019 08:34), Max: 36.8 (06 May 2019 23:57)  T(F): 97.9 (07 May 2019 08:34), Max: 98.2 (06 May 2019 23:57)  HR: 65 (07 May 2019 08:34) (56 - 76)  BP: 164/78 (07 May 2019 08:34) (150/77 - 164/78)  BP(mean): --  RR: 16 (07 May 2019 08:34) (16 - 17)  SpO2: 100% (07 May 2019 08:34) (100% - 100%)  I&O's Summary    MEDICATIONS  (STANDING):  amLODIPine   Tablet 5 milliGRAM(s) Oral daily  aspirin enteric coated 81 milliGRAM(s) Oral daily  atorvastatin 40 milliGRAM(s) Oral at bedtime  colchicine 0.6 milliGRAM(s) Oral two times a day  dextrose 5% + sodium chloride 0.9%. 1000 milliLiter(s) (60 mL/Hr) IV Continuous <Continuous>  enoxaparin Injectable 40 milliGRAM(s) SubCutaneous daily  insulin lispro (HumaLOG) corrective regimen sliding scale   SubCutaneous Before meals and at bedtime  insulin lispro (HumaLOG) corrective regimen sliding scale   SubCutaneous every 6 hours  labetalol 100 milliGRAM(s) Oral two times a day  nystatin Powder 1 Application(s) Topical two times a day  pantoprazole    Tablet 40 milliGRAM(s) Oral two times a day  potassium chloride    Tablet ER 40 milliEquivalent(s) Oral every 4 hours  predniSONE   Tablet 20 milliGRAM(s) Oral two times a day    MEDICATIONS  (PRN):  acetaminophen   Tablet .. 650 milliGRAM(s) Oral every 6 hours PRN Mild Pain (1 - 3)  benzocaine 15 mG/menthol 3.6 mG Lozenge 1 Lozenge Oral four times a day PRN Sore Throat    LABS:                        13.1   7.86  )-----------( 334      ( 07 May 2019 06:21 )             39.1     05-07    138  |  105  |  13  ----------------------------<  94  3.3<L>   |  27  |  0.83    Ca    8.7      07 May 2019 06:21  Phos  3.4     05-07  Mg     2.2     05-07          CAPILLARY BLOOD GLUCOSE      POCT Blood Glucose.: 184 mg/dL (07 May 2019 11:30)  POCT Blood Glucose.: 105 mg/dL (07 May 2019 08:51)  POCT Blood Glucose.: 163 mg/dL (06 May 2019 21:15)  POCT Blood Glucose.: 106 mg/dL (06 May 2019 17:06)  POCT Blood Glucose.: 162 mg/dL (06 May 2019 11:56)          REVIEW OF SYSTEMS:  CONSTITUTIONAL: No fever, weight loss, or fatigue  EYES: No eye pain, visual disturbances, or discharge  ENMT:  No difficulty hearing, tinnitus, vertigo; No sinus or throat pain  NECK: No pain or stiffness  RESPIRATORY: No cough, wheezing, chills or hemoptysis; No shortness of breath  CARDIOVASCULAR: No chest pain, palpitations, dizziness, or leg swelling  GASTROINTESTINAL: No abdominal or epigastric pain. No nausea, vomiting, or hematemesis; No diarrhea or constipation. No melena or hematochezia.  GENITOURINARY: No dysuria, frequency, hematuria, or incontinence  NEUROLOGICAL: No headaches, memory loss, loss of strength, numbness, or tremors    RADIOLOGY & ADDITIONAL TESTS:    Consultant(s) Notes Reviewed:  [x ] YES  [ ] NO    PHYSICAL EXAM:  GENERAL: NAD, well-groomed, well-developed,not in any distress ,  HEAD:  Atraumatic, Normocephalic  NECK: Supple, No JVD, Normal thyroid  NERVOUS SYSTEM:  Alert & Oriented X3, No focal deficit   CHEST/LUNG: Good air entry bilateral with no  rales, rhonchi, wheezing, or rubs  HEART: Regular rate and rhythm; No murmurs, rubs, or gallops  ABDOMEN: Soft, Nontender, Nondistended; Bowel sounds present  EXTREMITIES:  2+ Peripheral Pulses, No clubbing, cyanosis, or edema  Joint deformity as well swelling +   Care Discussed with Consultants/Other Providers [ x] YES  [ ] NO
INTERVAL HPI/OVERNIGHT EVENTS: I feel better with no diarrhea since AM .   Vital Signs Last 24 Hrs  T(C): 36.6 (08 May 2019 07:50), Max: 36.8 (07 May 2019 16:42)  T(F): 97.9 (08 May 2019 07:50), Max: 98.2 (07 May 2019 16:42)  HR: 77 (08 May 2019 09:53) (57 - 77)  BP: 123/85 (08 May 2019 09:53) (108/81 - 171/83)  BP(mean): --  RR: 17 (08 May 2019 07:50) (16 - 17)  SpO2: 99% (08 May 2019 07:50) (99% - 100%)  I&O's Summary    MEDICATIONS  (STANDING):  amLODIPine   Tablet 10 milliGRAM(s) Oral daily  aspirin enteric coated 81 milliGRAM(s) Oral daily  atorvastatin 40 milliGRAM(s) Oral at bedtime  enoxaparin Injectable 40 milliGRAM(s) SubCutaneous daily  insulin lispro (HumaLOG) corrective regimen sliding scale   SubCutaneous Before meals and at bedtime  labetalol 100 milliGRAM(s) Oral two times a day  nystatin Powder 1 Application(s) Topical two times a day  pantoprazole    Tablet 40 milliGRAM(s) Oral two times a day  predniSONE   Tablet 20 milliGRAM(s) Oral two times a day    MEDICATIONS  (PRN):  acetaminophen   Tablet .. 650 milliGRAM(s) Oral every 6 hours PRN Mild Pain (1 - 3)  benzocaine 15 mG/menthol 3.6 mG Lozenge 1 Lozenge Oral four times a day PRN Sore Throat    LABS:                        13.1   8.00  )-----------( 301      ( 08 May 2019 06:22 )             38.8     05-08    142  |  106  |  12  ----------------------------<  85  3.8   |  26  |  0.80    Ca    8.6      08 May 2019 06:22  Phos  3.2     05-08  Mg     2.2     05-08      PT/INR - ( 08 May 2019 06:21 )   PT: 12.2 sec;   INR: 1.10 ratio             CAPILLARY BLOOD GLUCOSE      POCT Blood Glucose.: 99 mg/dL (08 May 2019 11:44)  POCT Blood Glucose.: 86 mg/dL (08 May 2019 07:00)  POCT Blood Glucose.: 146 mg/dL (07 May 2019 21:20)  POCT Blood Glucose.: 131 mg/dL (07 May 2019 16:49)          REVIEW OF SYSTEMS:  CONSTITUTIONAL: No fever, weight loss, or fatigue  EYES: No eye pain, visual disturbances, or discharge  ENMT:  No difficulty hearing, tinnitus, vertigo; No sinus or throat pain  RESPIRATORY: No cough, wheezing, chills or hemoptysis; No shortness of breath  CARDIOVASCULAR: No chest pain, palpitations, dizziness, or leg swelling  GASTROINTESTINAL: No abdominal or epigastric pain. No nausea, vomiting, or hematemesis; No diarrhea or constipation. No melena or hematochezia.  GENITOURINARY: No dysuria, frequency, hematuria, or incontinence  NEUROLOGICAL: No headaches, memory loss, loss of strength, numbness, or tremors  SKIN: No itching, burning, rashes, or lesions   LYMPH NODES: No enlarged glands  ENDOCRINE: joint pain less :    Consultant(s) Notes Reviewed:  [x ] YES  [ ] NO    PHYSICAL EXAM:  GENERAL: NAD, well-groomed, well-developed,not in any distress ,  HEAD:  Atraumatic, Normocephalic  EYES: EOMI, PERRLA, conjunctiva and sclera clear  ENMT: No tonsillar erythema, exudates, or enlargement; Moist mucous membranes, Good dentition, No lesions  NECK: Supple, No JVD, Normal thyroid  NERVOUS SYSTEM:  Alert & Oriented X3, No focal deficit   CHEST/LUNG: Good air entry bilateral with no  rales, rhonchi, wheezing, or rubs  HEART: Regular rate and rhythm; No murmurs, rubs, or gallops  ABDOMEN: Soft, Nontender, Nondistended; Bowel sounds present  EXTREMITIES:  2+ Peripheral Pulses, No clubbing, cyanosis, or edema  joint deformity of hands and tenderness with painful ROM ,    Care Discussed with Consultants/Other Providers [ x] YES  [ ] NO
INTERVAL HPI/OVERNIGHT EVENTS: Slightly better .   Vital Signs Last 24 Hrs  T(C): 36.6 (05 May 2019 11:09), Max: 36.9 (05 May 2019 06:47)  T(F): 97.8 (05 May 2019 11:09), Max: 98.5 (05 May 2019 07:57)  HR: 78 (05 May 2019 11:09) (61 - 78)  BP: 153/87 (05 May 2019 11:09) (124/64 - 153/87)  BP(mean): --  RR: 16 (05 May 2019 11:09) (16 - 16)  SpO2: 98% (05 May 2019 11:09) (96% - 98%)  I&O's Summary    MEDICATIONS  (STANDING):  aspirin enteric coated 81 milliGRAM(s) Oral daily  colchicine 0.6 milliGRAM(s) Oral two times a day  enoxaparin Injectable 40 milliGRAM(s) SubCutaneous daily  insulin lispro (HumaLOG) corrective regimen sliding scale   SubCutaneous Before meals and at bedtime  nystatin Powder 1 Application(s) Topical two times a day  predniSONE   Tablet 40 milliGRAM(s) Oral daily    MEDICATIONS  (PRN):    LABS:                        12.6   5.41  )-----------( 282      ( 05 May 2019 06:03 )             37.3     05-05    137  |  104  |  15  ----------------------------<  96  3.5   |  28  |  0.86    Ca    8.8      05 May 2019 06:03  Phos  4.4     05-05  Mg     2.0     05-05    TPro  7.2  /  Alb  3.3<L>  /  TBili  0.2  /  DBili  x   /  AST  12  /  ALT  16  /  AlkPhos  65  05-05        CAPILLARY BLOOD GLUCOSE      POCT Blood Glucose.: 232 mg/dL (05 May 2019 10:57)  POCT Blood Glucose.: 112 mg/dL (05 May 2019 08:44)          REVIEW OF SYSTEMS:  CONSTITUTIONAL: No fever, weight loss, or fatigue  EYES: No eye pain, visual disturbances, or discharge  RESPIRATORY: No cough, wheezing, chills or hemoptysis; No shortness of breath  CARDIOVASCULAR: No chest pain, palpitations, dizziness, or leg swelling  GASTROINTESTINAL: No abdominal or epigastric pain. No nausea, vomiting, or hematemesis; No diarrhea or constipation. No melena or hematochezia.  GENITOURINARY: No dysuria, frequency, hematuria, or incontinence  NEUROLOGICAL: No headaches, memory loss, loss of strength, numbness, or tremors  MUSCULOSKELETAL: joint pain     Consultant(s) Notes Reviewed:  [x ] YES  [ ] NO    PHYSICAL EXAM:  GENERAL: NAD, well-groomed, well-developed,not in any distress ,  HEAD:  Atraumatic, Normocephalic  EYES: EOMI, PERRLA, conjunctiva and sclera clear  ENMT: No tonsillar erythema, exudates, or enlargement; Moist mucous membranes, Good dentition, No lesions  NECK: Supple, No JVD, Normal thyroid  NERVOUS SYSTEM:  Alert & Oriented X3, old residual weakness +  CHEST/LUNG: Good air entry bilateral with no  rales, rhonchi, wheezing, or rubs  HEART: Regular rate and rhythm; No murmurs, rubs, or gallops  ABDOMEN: Soft, Nontender, Nondistended; Bowel sounds present  EXTREMITIES:  2+ Peripheral Pulses, No clubbing, cyanosis, or edema  Knee, hands and wrist swollen and tender.     Care Discussed with Consultants/Other Providers [ x] YES  [ ] NO
Lucien Rudolph MD  Interventional Cardiology / Advance Heart Failure and Cardiac Transplant Specialist  [place] Office : [phone] [place] [address]  Tel:   [place] Office : [address] [facility] [place] [address]  Tel: [phone]  Cell : [phone]    Subjective : Pt lying in bed comfortable, not in distress, denies any chest pain or SOB  	  MEDICATIONS:  amLODIPine   Tablet 10 milliGRAM(s) Oral daily  aspirin enteric coated 81 milliGRAM(s) Oral daily  enoxaparin Injectable 40 milliGRAM(s) SubCutaneous daily  labetalol 100 milliGRAM(s) Oral two times a day        acetaminophen   Tablet .. 650 milliGRAM(s) Oral every 6 hours PRN    pantoprazole    Tablet 40 milliGRAM(s) Oral before breakfast    atorvastatin 40 milliGRAM(s) Oral at bedtime  colchicine 0.6 milliGRAM(s) Oral two times a day  insulin lispro (HumaLOG) corrective regimen sliding scale   SubCutaneous Before meals and at bedtime  predniSONE   Tablet 20 milliGRAM(s) Oral two times a day    benzocaine 15 mG/menthol 3.6 mG Lozenge 1 Lozenge Oral four times a day PRN  nystatin Powder 1 Application(s) Topical two times a day      PHYSICAL EXAM:  T(C): 36.8 (05-09-19 @ 15:50), Max: 36.9 (05-09-19 @ 08:10)  HR: 66 (05-09-19 @ 15:50) (61 - 66)  BP: 148/82 (05-09-19 @ 15:50) (148/82 - 167/83)  RR: 18 (05-09-19 @ 15:50) (17 - 18)  SpO2: 100% (05-09-19 @ 15:50) (100% - 100%)  Wt(kg): --  I&O's Summary        Appearance: Normal	  HEENT:   Normal oral mucosa, PERRL, EOMI	  Cardiovascular: Normal S1 S2, No JVD, No murmurs, No edema  Respiratory: Lungs clear to auscultation	  Gastrointestinal:  Soft, Non-tender, + BS	  Extremities: Normal range of motion, No clubbing, cyanosis or edema                                    13.6   8.40  )-----------( 297      ( 09 May 2019 06:27 )             39.8     05-08    142  |  106  |  12  ----------------------------<  85  3.8   |  26  |  0.80    Ca    8.6      08 May 2019 06:22  Phos  3.2     05-08  Mg     2.2     05-08      proBNP:   Lipid Profile:   HgA1c:   TSH:
Lucien Rudolph MD  Interventional Cardiology / Advance Heart Failure and Cardiac Transplant Specialist  [place] Office : [address] [place] [address]  Tel:   [place] Office : [address] [facility] [place] [address]  Tel: [phone]  Cell : [phone]    Subjective : Pt lying in bed comfortable, not in distress, denies any chest pain or SOB  	  MEDICATIONS:  amLODIPine   Tablet 10 milliGRAM(s) Oral daily  aspirin enteric coated 81 milliGRAM(s) Oral daily  enoxaparin Injectable 40 milliGRAM(s) SubCutaneous daily  labetalol 100 milliGRAM(s) Oral two times a day        acetaminophen   Tablet .. 650 milliGRAM(s) Oral every 6 hours PRN    pantoprazole    Tablet 40 milliGRAM(s) Oral before breakfast    atorvastatin 40 milliGRAM(s) Oral at bedtime  insulin lispro (HumaLOG) corrective regimen sliding scale   SubCutaneous Before meals and at bedtime  predniSONE   Tablet 20 milliGRAM(s) Oral two times a day    benzocaine 15 mG/menthol 3.6 mG Lozenge 1 Lozenge Oral four times a day PRN  nystatin Powder 1 Application(s) Topical two times a day      PHYSICAL EXAM:  T(C): 36.6 (05-08-19 @ 16:13), Max: 36.6 (05-08-19 @ 01:41)  HR: 63 (05-08-19 @ 16:13) (60 - 77)  BP: 130/66 (05-08-19 @ 16:13) (108/81 - 171/83)  RR: 17 (05-08-19 @ 16:13) (16 - 17)  SpO2: 100% (05-08-19 @ 16:13) (99% - 100%)  Wt(kg): --  I&O's Summary        Appearance: Normal	  HEENT:   Normal oral mucosa, PERRL, EOMI	  Cardiovascular: Normal S1 S2, No JVD, No murmurs, No edema  Respiratory: Lungs clear to auscultation	  Gastrointestinal:  Soft, Non-tender, + BS	  Extremities: Normal range of motion, No clubbing, cyanosis or edema                                    13.1   8.00  )-----------( 301      ( 08 May 2019 06:22 )             38.8     05-08    142  |  106  |  12  ----------------------------<  85  3.8   |  26  |  0.80    Ca    8.6      08 May 2019 06:22  Phos  3.2     05-08  Mg     2.2     05-08      proBNP:   Lipid Profile:   HgA1c:   TSH:
Patient is a 63y old  Male who presents with a chief complaint of Gout flare (06 May 2019 12:13)      INTERVAL HPI/OVERNIGHT EVENTS: Pt still c/o black stools . FOBT -ve x 2 . Hb stable around 13 .   hand pain improved and pt feels well, Wants to get OOB and do physical therapy  K replaced     MEDICATIONS  (STANDING):  amLODIPine   Tablet 5 milliGRAM(s) Oral daily  aspirin enteric coated 81 milliGRAM(s) Oral daily  atorvastatin 40 milliGRAM(s) Oral at bedtime  colchicine 0.6 milliGRAM(s) Oral two times a day  dextrose 5% + sodium chloride 0.9%. 1000 milliLiter(s) (60 mL/Hr) IV Continuous <Continuous>  enoxaparin Injectable 40 milliGRAM(s) SubCutaneous daily  insulin lispro (HumaLOG) corrective regimen sliding scale   SubCutaneous Before meals and at bedtime  insulin lispro (HumaLOG) corrective regimen sliding scale   SubCutaneous every 6 hours  labetalol 100 milliGRAM(s) Oral two times a day  nystatin Powder 1 Application(s) Topical two times a day  pantoprazole    Tablet 40 milliGRAM(s) Oral two times a day  potassium chloride    Tablet ER 40 milliEquivalent(s) Oral every 4 hours  predniSONE   Tablet 20 milliGRAM(s) Oral two times a day    MEDICATIONS  (PRN):  acetaminophen   Tablet .. 650 milliGRAM(s) Oral every 6 hours PRN Mild Pain (1 - 3)  benzocaine 15 mG/menthol 3.6 mG Lozenge 1 Lozenge Oral four times a day PRN Sore Throat      Allergies    Diovan (Rash)    Intolerances        REVIEW OF SYSTEMS:  CONSTITUTIONAL: No fever, weight loss, or fatigue  RESPIRATORY: No cough, wheezing, chills or hemoptysis; No shortness of breath  CARDIOVASCULAR: No chest pain, palpitations, dizziness, or leg swelling  GASTROINTESTINAL: No abdominal or epigastric pain. No nausea, vomiting, or hematemesis; No diarrhea or constipation. black stools++  NEUROLOGICAL: No headaches, memory loss, loss of strength, numbness, or tremors  EXT: mild hand joint pain   SKIN: No itching, burning, rashes, or lesions     Vital Signs Last 24 Hrs  T(C): 36.6 (07 May 2019 08:34), Max: 36.8 (06 May 2019 23:57)  T(F): 97.9 (07 May 2019 08:34), Max: 98.2 (06 May 2019 23:57)  HR: 65 (07 May 2019 08:34) (56 - 76)  BP: 164/78 (07 May 2019 08:34) (150/77 - 164/78)  BP(mean): --  RR: 16 (07 May 2019 08:34) (16 - 17)  SpO2: 100% (07 May 2019 08:34) (100% - 100%)    PHYSICAL EXAM:  GENERAL: NAD,  HEAD:  Atraumatic, Normocephalic  EYES: EOMI, PERRLA, conjunctiva and sclera clear  NECK: Supple, No JVD, Normal thyroid  CHEST/LUNG: Clear to percussion bilaterally; No rales, rhonchi, wheezing, or rubs  HEART: Regular rate and rhythm; No murmurs, rubs, or gallops  ABDOMEN: Soft, Nontender, Nondistended; Bowel sounds present  NERVOUS SYSTEM:  Alert & Oriented X3, Good concentration; Motor Strength 5/5 B/L   EXTREMITIES:  2+ Peripheral Pulses, No clubbing, cyanosis, or edema, IP joint deformities b/l hands  SKIN;    LABS:                        13.1   7.86  )-----------( 334      ( 07 May 2019 06:21 )             39.1     05-07    138  |  105  |  13  ----------------------------<  94  3.3<L>   |  27  |  0.83    Ca    8.7      07 May 2019 06:21  Phos  3.4     05-07  Mg     2.2     05-07          CAPILLARY BLOOD GLUCOSE      POCT Blood Glucose.: 105 mg/dL (07 May 2019 08:51)  POCT Blood Glucose.: 163 mg/dL (06 May 2019 21:15)  POCT Blood Glucose.: 106 mg/dL (06 May 2019 17:06)  POCT Blood Glucose.: 162 mg/dL (06 May 2019 11:56)      RADIOLOGY & ADDITIONAL TESTS:
Patient is a 63y old  Male who presents with a chief complaint of Joints pain (07 May 2019 15:18)      INTERVAL HPI/OVERNIGHT EVENTS: Pt got EGD done today which showed only gastritis , no active source of bleeding. Pt doesnt have any diarrhea today. Will send the GI PCR, ova n parasite and stool Cx . If diarrhea doesnt resolve then will stop Colchicine as it can cause diarrhea.   DASh diet resumed      MEDICATIONS  (STANDING):  amLODIPine   Tablet 10 milliGRAM(s) Oral daily  aspirin enteric coated 81 milliGRAM(s) Oral daily  atorvastatin 40 milliGRAM(s) Oral at bedtime  enoxaparin Injectable 40 milliGRAM(s) SubCutaneous daily  insulin lispro (HumaLOG) corrective regimen sliding scale   SubCutaneous Before meals and at bedtime  labetalol 100 milliGRAM(s) Oral two times a day  nystatin Powder 1 Application(s) Topical two times a day  pantoprazole    Tablet 40 milliGRAM(s) Oral two times a day  predniSONE   Tablet 20 milliGRAM(s) Oral two times a day    MEDICATIONS  (PRN):  acetaminophen   Tablet .. 650 milliGRAM(s) Oral every 6 hours PRN Mild Pain (1 - 3)  benzocaine 15 mG/menthol 3.6 mG Lozenge 1 Lozenge Oral four times a day PRN Sore Throat      Allergies    Diovan (Rash)    Intolerances        REVIEW OF SYSTEMS:  CONSTITUTIONAL: No fever, weight loss, or fatigue  RESPIRATORY: No cough, wheezing, chills or hemoptysis; No shortness of breath  CARDIOVASCULAR: No chest pain, palpitations, dizziness, or leg swelling  GASTROINTESTINAL: No abdominal or epigastric pain. No nausea, vomiting, or hematemesis; No diarrhea or constipation. No melena or hematochezia.  NEUROLOGICAL: No headaches, memory loss, loss of strength, numbness, or tremors  EXT: joint pain   SKIN: No itching, burning, rashes, or lesions     Vital Signs Last 24 Hrs  T(C): 36.6 (08 May 2019 07:50), Max: 36.8 (07 May 2019 16:42)  T(F): 97.9 (08 May 2019 07:50), Max: 98.2 (07 May 2019 16:42)  HR: 77 (08 May 2019 09:53) (57 - 77)  BP: 123/85 (08 May 2019 09:53) (108/81 - 171/83)  BP(mean): --  RR: 17 (08 May 2019 07:50) (16 - 17)  SpO2: 99% (08 May 2019 07:50) (99% - 100%)    PHYSICAL EXAM:  GENERAL: NAD,  HEAD:  Atraumatic, Normocephalic  EYES: EOMI, PERRLA, conjunctiva and sclera clear  NECK: Supple, No JVD, Normal thyroid  CHEST/LUNG: Clear to percussion bilaterally; No rales, rhonchi, wheezing, or rubs  HEART: Regular rate and rhythm; No murmurs, rubs, or gallops  ABDOMEN: Soft, Nontender, Nondistended; Bowel sounds present  NERVOUS SYSTEM:  Alert & Oriented X3, Good concentration; Motor Strength 5/5 B/L   EXTREMITIES:  2+ Peripheral Pulses, No clubbing, cyanosis, or edema, Dec ROM in hand joints   SKIN;    LABS:                        13.1   8.00  )-----------( 301      ( 08 May 2019 06:22 )             38.8     05-08    142  |  106  |  12  ----------------------------<  85  3.8   |  26  |  0.80    Ca    8.6      08 May 2019 06:22  Phos  3.2     05-08  Mg     2.2     05-08      PT/INR - ( 08 May 2019 06:21 )   PT: 12.2 sec;   INR: 1.10 ratio             CAPILLARY BLOOD GLUCOSE      POCT Blood Glucose.: 99 mg/dL (08 May 2019 11:44)  POCT Blood Glucose.: 86 mg/dL (08 May 2019 07:00)  POCT Blood Glucose.: 146 mg/dL (07 May 2019 21:20)  POCT Blood Glucose.: 131 mg/dL (07 May 2019 16:49)      RADIOLOGY & ADDITIONAL TESTS:
Patient is a 63y old  Male who presents with a chief complaint of Joints pain (09 May 2019 10:43)      INTERVAL HPI/OVERNIGHT EVENTS: PT feels well today , pain and stiffness has improved , Diarrhea resolved now , Hb stable     MEDICATIONS  (STANDING):  amLODIPine   Tablet 10 milliGRAM(s) Oral daily  aspirin enteric coated 81 milliGRAM(s) Oral daily  atorvastatin 40 milliGRAM(s) Oral at bedtime  enoxaparin Injectable 40 milliGRAM(s) SubCutaneous daily  insulin lispro (HumaLOG) corrective regimen sliding scale   SubCutaneous Before meals and at bedtime  labetalol 100 milliGRAM(s) Oral two times a day  nystatin Powder 1 Application(s) Topical two times a day  pantoprazole    Tablet 40 milliGRAM(s) Oral before breakfast  predniSONE   Tablet 20 milliGRAM(s) Oral two times a day    MEDICATIONS  (PRN):  acetaminophen   Tablet .. 650 milliGRAM(s) Oral every 6 hours PRN Mild Pain (1 - 3)  benzocaine 15 mG/menthol 3.6 mG Lozenge 1 Lozenge Oral four times a day PRN Sore Throat      Allergies    Diovan (Rash)    Intolerances        REVIEW OF SYSTEMS:  CONSTITUTIONAL: No fever, weight loss, or fatigue  RESPIRATORY: No cough, wheezing, chills or hemoptysis; No shortness of breath  CARDIOVASCULAR: No chest pain, palpitations, dizziness, or leg swelling  GASTROINTESTINAL: No abdominal or epigastric pain. No nausea, vomiting, or hematemesis; No diarrhea or constipation. No melena or hematochezia.  NEUROLOGICAL: No headaches, memory loss, loss of strength, numbness, or tremors  EXT: mild hand joint pains  SKIN: No itching, burning, rashes, or lesions     Vital Signs Last 24 Hrs  T(C): 36.9 (09 May 2019 08:10), Max: 36.9 (09 May 2019 08:10)  T(F): 98.4 (09 May 2019 08:10), Max: 98.4 (09 May 2019 08:10)  HR: 63 (09 May 2019 08:10) (61 - 63)  BP: 165/81 (09 May 2019 08:10) (130/66 - 167/83)  BP(mean): --  RR: 17 (09 May 2019 08:10) (17 - 18)  SpO2: 100% (09 May 2019 08:10) (100% - 100%)    PHYSICAL EXAM:  GENERAL: NAD,  HEAD:  Atraumatic, Normocephalic  EYES: EOMI, PERRLA, conjunctiva and sclera clear  NECK: Supple, No JVD, Normal thyroid  CHEST/LUNG: Clear to percussion bilaterally; No rales, rhonchi, wheezing, or rubs  HEART: Regular rate and rhythm; No murmurs, rubs, or gallops  ABDOMEN: Soft, Nontender, Nondistended; Bowel sounds present  NERVOUS SYSTEM:  Alert & Oriented X3, Good concentration; Motor Strength 5/5 B/L   EXTREMITIES:  2+ Peripheral Pulses, No clubbing, cyanosis, or edema, L hand joint stiffness, tenderness   SKIN;    LABS:                        13.6   8.40  )-----------( 297      ( 09 May 2019 06:27 )             39.8     05-08    142  |  106  |  12  ----------------------------<  85  3.8   |  26  |  0.80    Ca    8.6      08 May 2019 06:22  Phos  3.2     05-08  Mg     2.2     05-08      PT/INR - ( 08 May 2019 06:21 )   PT: 12.2 sec;   INR: 1.10 ratio             CAPILLARY BLOOD GLUCOSE      POCT Blood Glucose.: 103 mg/dL (09 May 2019 08:52)  POCT Blood Glucose.: 176 mg/dL (08 May 2019 21:06)  POCT Blood Glucose.: 127 mg/dL (08 May 2019 16:40)      RADIOLOGY & ADDITIONAL TESTS:
Summary:   63y  Male  with melena x 2 weeks.      Objective: Patient reports 3 episodes of melena overnight. Hemodynamically stable. Tolerating diet, no abdominal pain.     MEDICATIONS  (STANDING):  amLODIPine   Tablet 10 milliGRAM(s) Oral daily  aspirin enteric coated 81 milliGRAM(s) Oral daily  atorvastatin 40 milliGRAM(s) Oral at bedtime  colchicine 0.6 milliGRAM(s) Oral two times a day  dextrose 5% + sodium chloride 0.9%. 1000 milliLiter(s) (60 mL/Hr) IV Continuous <Continuous>  enoxaparin Injectable 40 milliGRAM(s) SubCutaneous daily  insulin lispro (HumaLOG) corrective regimen sliding scale   SubCutaneous Before meals and at bedtime  insulin lispro (HumaLOG) corrective regimen sliding scale   SubCutaneous every 6 hours  labetalol 100 milliGRAM(s) Oral two times a day  nystatin Powder 1 Application(s) Topical two times a day  pantoprazole    Tablet 40 milliGRAM(s) Oral two times a day  potassium chloride    Tablet ER 40 milliEquivalent(s) Oral every 4 hours  predniSONE   Tablet 20 milliGRAM(s) Oral two times a day    MEDICATIONS  (PRN):  acetaminophen   Tablet .. 650 milliGRAM(s) Oral every 6 hours PRN Mild Pain (1 - 3)  benzocaine 15 mG/menthol 3.6 mG Lozenge 1 Lozenge Oral four times a day PRN Sore Throat              Vital Signs Last 24 Hrs  T(C): 36.6 (07 May 2019 08:34), Max: 36.8 (06 May 2019 23:57)  T(F): 97.9 (07 May 2019 08:34), Max: 98.2 (06 May 2019 23:57)  HR: 65 (07 May 2019 08:34) (56 - 76)  BP: 164/78 (07 May 2019 08:34) (150/77 - 164/78)  BP(mean): --  RR: 16 (07 May 2019 08:34) (16 - 17)  SpO2: 100% (07 May 2019 08:34) (100% - 100%)      General:  Well developed, well nourished, alert and active, no pallor, NAD.  HEENT:    Normal appearance of conjunctiva, ears, nose, lips, oropharynx, and oral mucosa, anicteric.  Neck:  No masses, no asymmetry.  Lymph Nodes:  No lymphadenopathy.   Cardiovascular:  RRR normal S1/S2, no murmur.  Respiratory:  CTA B/L, normal respiratory effort.   Abdominal:   soft, no masses or tenderness, normoactive BS, NT/ND, no HSM.  Extremities:   No clubbing or cyanosis, normal capillary refill, no edema.   Skin:   No rash, jaundice, lesions, eczema.   Musculoskeletal:  No joint swelling, erythema or tenderness.   Neuro: No focal deficits.   Other:       LABS:                        13.1   7.86  )-----------( 334      ( 07 May 2019 06:21 )             39.1     05-07    138  |  105  |  13  ----------------------------<  94  3.3<L>   |  27  |  0.83    Ca    8.7      07 May 2019 06:21  Phos  3.4     05-07  Mg     2.2     05-07            RADIOLOGY & ADDITIONAL TESTS:
INTERVAL HPI/OVERNIGHT EVENTS: I feel much better . No more diarrhea.   Vital Signs Last 24 Hrs  T(C): 36.9 (09 May 2019 08:10), Max: 36.9 (09 May 2019 08:10)  T(F): 98.4 (09 May 2019 08:10), Max: 98.4 (09 May 2019 08:10)  HR: 63 (09 May 2019 08:10) (61 - 63)  BP: 165/81 (09 May 2019 08:10) (130/66 - 167/83)  BP(mean): --  RR: 17 (09 May 2019 08:10) (17 - 18)  SpO2: 100% (09 May 2019 08:10) (100% - 100%)  I&O's Summary    MEDICATIONS  (STANDING):  amLODIPine   Tablet 10 milliGRAM(s) Oral daily  aspirin enteric coated 81 milliGRAM(s) Oral daily  atorvastatin 40 milliGRAM(s) Oral at bedtime  enoxaparin Injectable 40 milliGRAM(s) SubCutaneous daily  insulin lispro (HumaLOG) corrective regimen sliding scale   SubCutaneous Before meals and at bedtime  labetalol 100 milliGRAM(s) Oral two times a day  nystatin Powder 1 Application(s) Topical two times a day  pantoprazole    Tablet 40 milliGRAM(s) Oral before breakfast  predniSONE   Tablet 20 milliGRAM(s) Oral two times a day    MEDICATIONS  (PRN):  acetaminophen   Tablet .. 650 milliGRAM(s) Oral every 6 hours PRN Mild Pain (1 - 3)  benzocaine 15 mG/menthol 3.6 mG Lozenge 1 Lozenge Oral four times a day PRN Sore Throat    LABS:                        13.6   8.40  )-----------( 297      ( 09 May 2019 06:27 )             39.8     05-08    142  |  106  |  12  ----------------------------<  85  3.8   |  26  |  0.80    Ca    8.6      08 May 2019 06:22  Phos  3.2     05-08  Mg     2.2     05-08      PT/INR - ( 08 May 2019 06:21 )   PT: 12.2 sec;   INR: 1.10 ratio             CAPILLARY BLOOD GLUCOSE      POCT Blood Glucose.: 103 mg/dL (09 May 2019 08:52)  POCT Blood Glucose.: 176 mg/dL (08 May 2019 21:06)  POCT Blood Glucose.: 127 mg/dL (08 May 2019 16:40)  POCT Blood Glucose.: 99 mg/dL (08 May 2019 11:44)          REVIEW OF SYSTEMS:  CONSTITUTIONAL: No fever, weight loss, or fatigue  EYES: No eye pain, visual disturbances, or discharge  ENMT:  No difficulty hearing, tinnitus, vertigo; No sinus or throat pain  NECK: No pain or stiffness  RESPIRATORY: No cough, wheezing, chills or hemoptysis; No shortness of breath  CARDIOVASCULAR: No chest pain, palpitations, dizziness, or leg swelling  GASTROINTESTINAL: No abdominal or epigastric pain. No nausea, vomiting, or hematemesis; No diarrhea or constipation. No melena or hematochezia.  GENITOURINARY: No dysuria, frequency, hematuria, or incontinence  NEUROLOGICAL: No headaches, memory loss, loss of strength, numbness, or tremors  MUSCULOSKELETAL: less joint pain.     Consultant(s) Notes Reviewed:  [x ] YES  [ ] NO    PHYSICAL EXAM:  GENERAL: NAD, well-groomed, well-developed, not in any distress ,  HEAD:  Atraumatic, Normocephalic  EYES: EOMI, PERRLA, conjunctiva and sclera clear  ENMT: No tonsillar erythema, exudates, or enlargement; Moist mucous membranes, Good dentition, No lesions  NECK: Supple, No JVD, Normal thyroid  NERVOUS SYSTEM:  Alert & Oriented X3, No focal deficit   CHEST/LUNG: Good air entry bilateral with no  rales, rhonchi, wheezing, or rubs  HEART: Regular rate and rhythm; No murmurs, rubs, or gallops  ABDOMEN: Soft, Nontender, Nondistended; Bowel sounds present  EXTREMITIES:  2+ Peripheral Pulses, No clubbing, cyanosis, or edema    Care Discussed with Consultants/Other Providers [ x] YES  [ ] NO
Patient is a 63y old  Male who presents with a chief complaint of Joints pain (05 May 2019 12:56)      INTERVAL HPI/OVERNIGHT EVENTS: Pt's joint pain improving , no visible swelling , Pt c/o dark stools since last 2 weeks   H/h dropped yesterday. FOBT -ve x 1 . Will recheck CBC today and FOBT     MEDICATIONS  (STANDING):  aspirin enteric coated 81 milliGRAM(s) Oral daily  colchicine 0.6 milliGRAM(s) Oral two times a day  enoxaparin Injectable 40 milliGRAM(s) SubCutaneous daily  insulin lispro (HumaLOG) corrective regimen sliding scale   SubCutaneous Before meals and at bedtime  lisinopril 10 milliGRAM(s) Oral daily  nystatin Powder 1 Application(s) Topical two times a day  predniSONE   Tablet 20 milliGRAM(s) Oral two times a day    MEDICATIONS  (PRN):  acetaminophen   Tablet .. 650 milliGRAM(s) Oral every 6 hours PRN Mild Pain (1 - 3)  benzocaine 15 mG/menthol 3.6 mG Lozenge 1 Lozenge Oral four times a day PRN Sore Throat      Allergies    Diovan (Rash)    Intolerances        REVIEW OF SYSTEMS:  CONSTITUTIONAL: No fever, weight loss, or fatigue  RESPIRATORY: No cough, wheezing, chills or hemoptysis; No shortness of breath  CARDIOVASCULAR: No chest pain, palpitations, dizziness, or leg swelling  GASTROINTESTINAL: No abdominal or epigastric pain. No nausea, vomiting, or hematemesis; No diarrhea or constipation. No melena or hematochezia.  NEUROLOGICAL: No headaches, memory loss, loss of strength, numbness, or tremors  SKIN: No : Pain over the L ankle and hand joints     Vital Signs Last 24 Hrs  T(C): 36.8 (06 May 2019 09:07), Max: 37.1 (06 May 2019 00:26)  T(F): 98.3 (06 May 2019 09:07), Max: 98.7 (06 May 2019 00:26)  HR: 62 (06 May 2019 09:07) (62 - 75)  BP: 134/81 (06 May 2019 09:07) (134/81 - 172/99)  BP(mean): --  RR: 17 (06 May 2019 09:07) (17 - 19)  SpO2: 99% (06 May 2019 09:07) (93% - 99%)    PHYSICAL EXAM:  GENERAL: NAD,  HEAD:  Atraumatic, Normocephalic  EYES: EOMI, PERRLA, conjunctiva and sclera clear  NECK: Supple, No JVD, Normal thyroid  CHEST/LUNG: Clear to percussion bilaterally; No rales, rhonchi, wheezing, or rubs  HEART: Regular rate and rhythm; No murmurs, rubs, or gallops  ABDOMEN: Soft, Nontender, Nondistended; Bowel sounds present  NERVOUS SYSTEM:  Alert & Oriented X3, Good concentration; Motor Strength 5/5 B/L   EXTREMITIES:  2+ Peripheral Pulses, No clubbing, cyanosis, or edema  SKIN; no redness over the hand. ankle , Mildly tender    LABS:                        12.6   5.41  )-----------( 282      ( 05 May 2019 06:03 )             37.3     05-05    137  |  104  |  15  ----------------------------<  96  3.5   |  28  |  0.86    Ca    8.8      05 May 2019 06:03  Phos  4.4     05-05  Mg     2.0     05-05    TPro  7.2  /  Alb  3.3<L>  /  TBili  0.2  /  DBili  x   /  AST  12  /  ALT  16  /  AlkPhos  65  05-05        CAPILLARY BLOOD GLUCOSE      POCT Blood Glucose.: 110 mg/dL (06 May 2019 08:30)  POCT Blood Glucose.: 126 mg/dL (05 May 2019 21:02)  POCT Blood Glucose.: 145 mg/dL (05 May 2019 15:47)      RADIOLOGY & ADDITIONAL TESTS:

## 2019-05-09 NOTE — PROGRESS NOTE ADULT - ASSESSMENT
63 Male with PMH of HTN, Gout, Pre-DM, CAD (1 stent in 2008), Cervical radiculopathy (s/p surgery in 2016) and Rt Hemiplegia due to cervical spine surgery came to hospital for diffuse pain and swelling of hand and foot joints. Admitted to medicine for gout flare.      Problem/Plan - 1:  ·  Problem: Acute Gout attack with Polyarthritis .  Plan: Rt elbow pain, Left hand 1st interphalangeal joints pain/swelling, Rt ankle pain  Swollen and redness of joints   Serum uric acid level WNL but was taking Allopurinol .Noted. RF and DO.   On  Prednisone and Colchicine and will slowly taper as outpt. Add Allopurinal once completely asymptomatic and still on Colchicine . 3 days later DC colchicine if continues to remain asymptomatic.   Neg Blood Cx .  X rays noted. Erosions confirming Gout .    Problem/Plan - 2:  ·  Problem: HTN (hypertension).  Plan: Restart home meds.      Problem/Plan - 3:  ·  Problem: CAD (coronary artery disease).  Plan: Continue home meds. Cardiology to see pt.      Problem/Plan - 4:  ·  Problem: Acute Diarrhea .  Plan: Resolved.  HH stable . GI helping and S/P EGD.       Problem/Plan - 5:  ·  Problem: Prediabetes.  Plan: Sugars and hGBA1C fine. No need for RX.     Problem/Plan - 6:  ·  Problem: Need for prophylactic measure.  Plan: Improve score 2  Lovenox sq.     Disposition : DC planning to [facility] as Medically stable .Outpt F/U with PCP and Rheumatology .

## 2019-05-09 NOTE — PROGRESS NOTE ADULT - PROBLEM SELECTOR PLAN 6
Improve score 2  Lovenox sq
Improve score 2  Lovenox sq   DC plan pending auth for [facility]

## 2019-05-09 NOTE — DISCHARGE NOTE NURSING/CASE MANAGEMENT/SOCIAL WORK - NSDCDPATPORTLINK_GEN_ALL_CORE
You can access the [facility] Portal, offered by [facility], by registering with the following website: [email or web address]

## 2019-05-09 NOTE — PROGRESS NOTE ADULT - REASON FOR ADMISSION
Joints pain

## 2019-05-09 NOTE — PROGRESS NOTE ADULT - PROBLEM SELECTOR PLAN 2
Pt c/o Dark stools since last 2 weeks   - FOBT -ve x 2  - H/h stable  - EGD : gastritis   -c/w PPI daily  - no more diarrhea today   - GI PCR -ve   - f/u  ova/parasite, Stool Cx ,   - GI consulted Dr Diaz

## 2019-05-10 LAB
CULTURE RESULTS: SIGNIFICANT CHANGE UP
CULTURE RESULTS: SIGNIFICANT CHANGE UP
SPECIMEN SOURCE: SIGNIFICANT CHANGE UP
SPECIMEN SOURCE: SIGNIFICANT CHANGE UP

## 2019-05-11 LAB
CULTURE RESULTS: SIGNIFICANT CHANGE UP
SPECIMEN SOURCE: SIGNIFICANT CHANGE UP